# Patient Record
Sex: MALE | Race: WHITE | NOT HISPANIC OR LATINO | Employment: FULL TIME | URBAN - METROPOLITAN AREA
[De-identification: names, ages, dates, MRNs, and addresses within clinical notes are randomized per-mention and may not be internally consistent; named-entity substitution may affect disease eponyms.]

---

## 2017-11-10 ENCOUNTER — OFFICE VISIT (OUTPATIENT)
Dept: FAMILY MEDICINE | Facility: CLINIC | Age: 22
End: 2017-11-10
Payer: COMMERCIAL

## 2017-11-10 VITALS
OXYGEN SATURATION: 95 % | HEIGHT: 73 IN | BODY MASS INDEX: 37.37 KG/M2 | TEMPERATURE: 98.1 F | HEART RATE: 73 BPM | RESPIRATION RATE: 16 BRPM | WEIGHT: 282 LBS | DIASTOLIC BLOOD PRESSURE: 70 MMHG | SYSTOLIC BLOOD PRESSURE: 120 MMHG

## 2017-11-10 DIAGNOSIS — R05.9 COUGH: Primary | ICD-10-CM

## 2017-11-10 PROCEDURE — 99213 OFFICE O/P EST LOW 20 MIN: CPT | Performed by: PHYSICIAN ASSISTANT

## 2017-11-10 RX ORDER — AZITHROMYCIN 250 MG/1
TABLET, FILM COATED ORAL
Qty: 6 TABLET | Refills: 0 | Status: SHIPPED | OUTPATIENT
Start: 2017-11-10 | End: 2017-11-22

## 2017-11-10 NOTE — NURSING NOTE
"Chief Complaint   Patient presents with     URI       Initial /70  Pulse 73  Temp 98.1  F (36.7  C) (Tympanic)  Resp 16  Ht 6' 1\" (1.854 m)  Wt 282 lb (127.9 kg)  SpO2 95%  BMI 37.21 kg/m2 Estimated body mass index is 37.21 kg/(m^2) as calculated from the following:    Height as of this encounter: 6' 1\" (1.854 m).    Weight as of this encounter: 282 lb (127.9 kg).  Medication Reconciliation: complete     Liss Lazaro CMA      "

## 2017-11-10 NOTE — MR AVS SNAPSHOT
"              After Visit Summary   11/10/2017    Shubham Castaneda    MRN: [id number]           Patient Information     Date Of Birth          1995        Visit Information        Provider Department      11/10/2017 1:50 PM Siegler, Nicole Joy, PA-C [facility]        Today's Diagnoses     Cough    -  1       Follow-ups after your visit        Your next 10 appointments already scheduled     Nov 22, 2017 10:30 AM CST   PHYSICAL with Gunnar Li MD   [facility]    [address] [place] [id number] [phone]              Who to contact     If you have questions or need follow up information about today's clinic visit or your schedule please contact [facility] directly at [phone].  Normal or non-critical lab and imaging results will be communicated to you by MyChart, letter or phone within 4 business days after the clinic has received the results. If you do not hear from us within 7 days, please contact the clinic through MyChart or phone. If you have a critical or abnormal lab result, we will notify you by phone as soon as possible.  Submit refill requests through [email or web address] or call your pharmacy and they will forward the refill request to us. Please allow 3 business days for your refill to be completed.          Additional Information About Your Visit        MyChart Information     [email or web address] lets you send messages to your doctor, view your test results, renew your prescriptions, schedule appointments and more. To sign up, go to [email or web address] . Click on \"Log in\" on the left side of the screen, which will take you to the Welcome page. Then click on \"Sign up Now\" on the right side of the page.     You will be asked to enter the access code listed below, as well as some personal information. Please follow the directions to create your " "username and password.     Your access code is: [email or web address]  Expires: 2018  2:35 PM     Your access code will  in 90 days. If you need help or a new code, please call your [facility] or [phone].        Care EveryWhere ID     This is your Care EveryWhere ID. This could be used by other organizations to access your [place] medical records  [id number]        Your Vitals Were     Pulse Temperature Respirations Height Pulse Oximetry BMI (Body Mass Index)    73 98.1  F (36.7  C) (Tympanic) 16 6' 1\" (1.854 m) 95% 37.21 kg/m2       Blood Pressure from Last 3 Encounters:   11/10/17 120/70   14 138/71    Weight from Last 3 Encounters:   11/10/17 282 lb (127.9 kg)   14 235 lb (106.6 kg) (99 %)*   06 104 lb (47.2 kg) (95 %)*     * Growth percentiles are based on [facility] 2-20 Years data.              Today, you had the following     No orders found for display         Today's Medication Changes          These changes are accurate as of: 11/10/17  2:35 PM.  If you have any questions, ask your nurse or doctor.               Start taking these medicines.        Dose/Directions    azithromycin 250 MG tablet   Commonly known as:  ZITHROMAX   Used for:  Cough   Started by:  Siegler, Nicole Joy, PA-C        Two tablets first day, then one tablet daily for four days.   Quantity:  6 tablet   Refills:  0            Where to get your medicines      These medications were sent to [facility] [address] [place] [address] [address]     Phone:  [phone]     azithromycin 250 MG tablet                Primary Care Provider Office Phone # Fax #    Gunnar Li [phone] [address] [facility] [address]        Equal Access to Services     DEYA MAGALLON AH: Hadii aad ku hadasho Soomaali, waaxda luqadaha, qaybta kaaljessy gill ah. [facility] [phone].    ATENCIÓN: Si olman gaitan, " tiene a palumbo disposición servicios gratuitos de asistencia lingüística. Josias denney [phone].    We comply with applicable federal civil rights laws and [place] laws. We do not discriminate on the basis of race, color, national origin, age, disability, sex, sexual orientation, or gender identity.            Thank you!     Thank you for choosing [facility]  for your care. Our goal is always to provide you with excellent care. Hearing back from our patients is one way we can continue to improve our services. Please take a few minutes to complete the written survey that you may receive in the mail after your visit with us. Thank you!             Your Updated Medication List - Protect others around you: Learn how to safely use, store and throw away your medicines at www.disposemymeds.org.          This list is accurate as of: 11/10/17  2:35 PM.  Always use your most recent med list.                   Brand Name Dispense Instructions for use Diagnosis    azithromycin 250 MG tablet    ZITHROMAX    6 tablet    Two tablets first day, then one tablet daily for four days.    Cough

## 2017-11-10 NOTE — PROGRESS NOTES
"  SUBJECTIVE:   Shubham Castaneda is a 22 year old male who presents to clinic today for the following health issues:    RESPIRATORY SYMPTOMS      Duration: 2 weeks    Description    nasal congestion, rhinorrhea, cough- dark grey sputum in the morning    Severity: moderate    Accompanying signs and symptoms: None    History (predisposing factors):  none    Precipitating or alleviating factors: None    Therapies tried and outcome:  Dayquil and Nyquil         ROS:  C: Negative for fever, chills, recent change in weight  SKIN: no rash or redness  Resp: Positive for no productive cough that is making him vomit and dry heave  GI: Negative for nausea, abdominal pain, heartburn, or change in bowel habits  MS: Negative for significant arthralgias or myalgias      PFSH: not a smoker, no hx of asthma.   No known illness exposure. He has been vaccinated against pertussis but has had it in the past with similar symptoms after being vaccinated.      Patient Active Problem List   Diagnosis     Other jaw asymmetry     Current Outpatient Prescriptions   Medication     azithromycin (ZITHROMAX) 250 MG tablet     No current facility-administered medications for this visit.        OBJECTIVE:                                                    /70  Pulse 73  Temp 98.1  F (36.7  C) (Tympanic)  Resp 16  Ht 6' 1\" (1.854 m)  Wt 282 lb (127.9 kg)  SpO2 95%  BMI 37.21 kg/m2  Body mass index is 37.21 kg/(m^2).  GENERAL: healthy, alert, in no acute distress  EYES: Grossly normal to inspection, EOMI, PERRL  HENT: Ear canals normal; TMs pearly gray without effusion. Nasal mucosa moist without edema or discharge. Oral mucous membranes moist, no lesions or ulcerations. Pharynx pink.  Uvula midline.  No postnasal drainage. Sinuses non-tender to palpation.  NECK: Non-tender, no adenopathy.  RESP: lungs clear to auscultation - no rales, no rhonchi, no wheezes  CV: regular rate and rhythm, normal S1 S2.  No peripheral edema.  SKIN: no " suspicious lesions, no rashes  PSYCH: Alert and oriented times 3;  Able to articulate logical thoughts. Affect is normal.    Diagnostic test results: none      ASSESSMENT/PLAN:                                                        ICD-10-CM    1. Cough R05 azithromycin (ZITHROMAX) 250 MG tablet     Discussed potential viral etiology of cough and instructions below for care. Provided antibiotic for pertussis as his post cough vomiting is suggestive.     Upper Respiratory Infection:    Your infection is most likely a virus at this point. Viruses cannot be treated by an antibiotic. These can take up to ten days until you start feeling better.     Mucinex 1200 mg twice daily is great to loosen up secretions in the sinuses, nasal passages, and upper airways.    Tylenol or Ibuprofen can be used for aches and pains or mild fevers.    Try saline sinus washes using a neti pot (they come with packets to make a salt/baking soda/water solution) or saline nasal spray to help with nasal congestion. Long hot steamy showers, or head over steamy water can help with congestion. Decongestants such as Sudafed can also be used if you don't have a history of high blood pressure.     Drink at least 8 glasses of water daily - this helps keep mucus thin and keeps you hydrated to help with fever control.     Salt water gargles, chloraseptic spray, or lozenges (with menthol) help with a sore throat. Honey can also be soothing for the throat.    Maintain a healthy diet to help your immune system combat this. Get lots of rest to help your body get over the illness. If you continue to have symptoms longer than 7-10 days or if they become worse, please return to clinic for further evaluation.        Nicole Joy Siegler, PA-C  [facility]

## 2017-11-11 ENCOUNTER — HEALTH MAINTENANCE LETTER (OUTPATIENT)
Age: 22
End: 2017-11-11

## 2017-11-22 ENCOUNTER — OFFICE VISIT (OUTPATIENT)
Dept: FAMILY MEDICINE | Facility: CLINIC | Age: 22
End: 2017-11-22
Payer: COMMERCIAL

## 2017-11-22 VITALS
WEIGHT: 282 LBS | SYSTOLIC BLOOD PRESSURE: 136 MMHG | DIASTOLIC BLOOD PRESSURE: 80 MMHG | RESPIRATION RATE: 16 BRPM | TEMPERATURE: 97.8 F | HEIGHT: 74 IN | BODY MASS INDEX: 36.19 KG/M2 | HEART RATE: 77 BPM | OXYGEN SATURATION: 96 %

## 2017-11-22 DIAGNOSIS — E66.09 CLASS 2 OBESITY DUE TO EXCESS CALORIES WITHOUT SERIOUS COMORBIDITY WITH BODY MASS INDEX (BMI) OF 35.0 TO 35.9 IN ADULT: ICD-10-CM

## 2017-11-22 DIAGNOSIS — Z11.3 SCREEN FOR STD (SEXUALLY TRANSMITTED DISEASE): ICD-10-CM

## 2017-11-22 DIAGNOSIS — Z00.00 ROUTINE MEDICAL EXAM: Primary | ICD-10-CM

## 2017-11-22 DIAGNOSIS — R61 NIGHT SWEATS: ICD-10-CM

## 2017-11-22 DIAGNOSIS — E66.812 CLASS 2 OBESITY DUE TO EXCESS CALORIES WITHOUT SERIOUS COMORBIDITY WITH BODY MASS INDEX (BMI) OF 35.0 TO 35.9 IN ADULT: ICD-10-CM

## 2017-11-22 LAB
ALBUMIN UR-MCNC: NEGATIVE MG/DL
APPEARANCE UR: CLEAR
BASOPHILS # BLD AUTO: 0 10E9/L (ref 0–0.2)
BASOPHILS NFR BLD AUTO: 0.5 %
BILIRUB UR QL STRIP: NEGATIVE
COLOR UR AUTO: YELLOW
DIFFERENTIAL METHOD BLD: NORMAL
EOSINOPHIL # BLD AUTO: 0.1 10E9/L (ref 0–0.7)
EOSINOPHIL NFR BLD AUTO: 0.9 %
ERYTHROCYTE [DISTWIDTH] IN BLOOD BY AUTOMATED COUNT: 12.9 % (ref 10–15)
GLUCOSE UR STRIP-MCNC: NEGATIVE MG/DL
HCT VFR BLD AUTO: 44.7 % (ref 40–53)
HGB BLD-MCNC: 15.5 G/DL (ref 13.3–17.7)
HGB UR QL STRIP: NEGATIVE
KETONES UR STRIP-MCNC: NEGATIVE MG/DL
LEUKOCYTE ESTERASE UR QL STRIP: NEGATIVE
LYMPHOCYTES # BLD AUTO: 1.9 10E9/L (ref 0.8–5.3)
LYMPHOCYTES NFR BLD AUTO: 26 %
MCH RBC QN AUTO: 29.2 PG (ref 26.5–33)
MCHC RBC AUTO-ENTMCNC: 34.7 G/DL (ref 31.5–36.5)
MCV RBC AUTO: 84 FL (ref 78–100)
MONOCYTES # BLD AUTO: 0.9 10E9/L (ref 0–1.3)
MONOCYTES NFR BLD AUTO: 11.8 %
NEUTROPHILS # BLD AUTO: 4.5 10E9/L (ref 1.6–8.3)
NEUTROPHILS NFR BLD AUTO: 60.8 %
NITRATE UR QL: NEGATIVE
PH UR STRIP: 6 PH (ref 5–7)
PLATELET # BLD AUTO: 275 10E9/L (ref 150–450)
RBC # BLD AUTO: 5.3 10E12/L (ref 4.4–5.9)
RBC #/AREA URNS AUTO: NORMAL /HPF
SOURCE: NORMAL
SP GR UR STRIP: 1.02 (ref 1–1.03)
UROBILINOGEN UR STRIP-ACNC: 0.2 EU/DL (ref 0.2–1)
WBC # BLD AUTO: 7.5 10E9/L (ref 4–11)
WBC #/AREA URNS AUTO: NORMAL /HPF

## 2017-11-22 PROCEDURE — 36415 COLL VENOUS BLD VENIPUNCTURE: CPT | Performed by: FAMILY MEDICINE

## 2017-11-22 PROCEDURE — 80061 LIPID PANEL: CPT | Performed by: FAMILY MEDICINE

## 2017-11-22 PROCEDURE — 87591 N.GONORRHOEAE DNA AMP PROB: CPT | Performed by: FAMILY MEDICINE

## 2017-11-22 PROCEDURE — 80053 COMPREHEN METABOLIC PANEL: CPT | Performed by: FAMILY MEDICINE

## 2017-11-22 PROCEDURE — 90715 TDAP VACCINE 7 YRS/> IM: CPT | Performed by: FAMILY MEDICINE

## 2017-11-22 PROCEDURE — 85025 COMPLETE CBC W/AUTO DIFF WBC: CPT | Performed by: FAMILY MEDICINE

## 2017-11-22 PROCEDURE — 86780 TREPONEMA PALLIDUM: CPT | Performed by: FAMILY MEDICINE

## 2017-11-22 PROCEDURE — 87491 CHLMYD TRACH DNA AMP PROBE: CPT | Performed by: FAMILY MEDICINE

## 2017-11-22 PROCEDURE — 90471 IMMUNIZATION ADMIN: CPT | Performed by: FAMILY MEDICINE

## 2017-11-22 PROCEDURE — 99395 PREV VISIT EST AGE 18-39: CPT | Mod: 25 | Performed by: FAMILY MEDICINE

## 2017-11-22 PROCEDURE — 81001 URINALYSIS AUTO W/SCOPE: CPT | Performed by: FAMILY MEDICINE

## 2017-11-22 PROCEDURE — 87389 HIV-1 AG W/HIV-1&-2 AB AG IA: CPT | Performed by: FAMILY MEDICINE

## 2017-11-22 NOTE — NURSING NOTE
"Chief Complaint   Patient presents with     Physical     night sweats       Initial /80  Pulse 77  Temp 97.8  F (36.6  C)  Resp 16  Ht 6' 2\" (1.88 m)  Wt 282 lb (127.9 kg)  SpO2 96%  BMI 36.21 kg/m2 Estimated body mass index is 36.21 kg/(m^2) as calculated from the following:    Height as of this encounter: 6' 2\" (1.88 m).    Weight as of this encounter: 282 lb (127.9 kg).  Medication Reconciliation: wojciech Luis CMA      "

## 2017-11-22 NOTE — MR AVS SNAPSHOT
After Visit Summary   11/22/2017    Shubham Castaneda    MRN: [id number]           Patient Information     Date Of Birth          1995        Visit Information        Provider Department      11/22/2017 10:30 AM Gunnar Li MD [facility]        Today's Diagnoses     Routine medical exam    -  1    Night sweats        Screen for STD (sexually transmitted disease)          Care Instructions      Preventive Health Recommendations  Male Ages 18 - 25     Yearly exam:             See your health care provider every year in order to  o   Review health changes.   o   Discuss preventive care.    o   Review your medicines if your doctor has prescribed any.    You should be tested each year for STDs (sexually transmitted diseases).     Talk to your provider about cholesterol testing.      If you are at risk for diabetes, you should have a diabetes test (fasting glucose).    Shots: Get a flu shot each year. Get a tetanus shot every 10 years.     Nutrition:    Eat at least 5 servings of fruits and vegetables daily.     Eat whole-grain bread, whole-wheat pasta and brown rice instead of white grains and rice.     Talk to your provider about calcium and Vitamin D.     Lifestyle    Exercise for at least 150 minutes a week (30 minutes a day, 5 days a week). This will help you control your weight and prevent disease.     Limit alcohol to one drink per day.     No smoking.     Wear sunscreen to prevent skin cancer.     See your dentist every six months for an exam and cleaning.             Follow-ups after your visit        Who to contact     If you have questions or need follow up information about today's clinic visit or your schedule please contact [facility] directly at [phone].  Normal or non-critical lab and imaging results will be communicated to you by MyChart, letter or phone within 4 business days after the clinic has received the  "results. If you do not hear from us within 7 days, please contact the clinic through [employer] or phone. If you have a critical or abnormal lab result, we will notify you by phone as soon as possible.  Submit refill requests through [employer] or call your pharmacy and they will forward the refill request to us. Please allow 3 business days for your refill to be completed.          Additional Information About Your Visit        Vis[employer] Information     [employer] lets you send messages to your doctor, view your test results, renew your prescriptions, schedule appointments and more. To sign up, go to [email or web address] . Click on \"Log in\" on the left side of the screen, which will take you to the Welcome page. Then click on \"Sign up Now\" on the right side of the page.     You will be asked to enter the access code listed below, as well as some personal information. Please follow the directions to create your username and password.     Your access code is: [email or web address]  Expires: 2018  2:35 PM     Your access code will  in 90 days. If you need help or a new code, please call your [place] clinic or [phone].        Care EveryWhere ID     This is your Care EveryWhere ID. This could be used by other organizations to access your [place] medical records  [id number]        Your Vitals Were     Pulse Temperature Respirations Height Pulse Oximetry BMI (Body Mass Index)    77 97.8  F (36.6  C) 16 6' 2\" (1.88 m) 96% 36.21 kg/m2       Blood Pressure from Last 3 Encounters:   17 136/80   11/10/17 120/70   14 138/71    Weight from Last 3 Encounters:   17 282 lb (127.9 kg)   11/10/17 282 lb (127.9 kg)   14 235 lb (106.6 kg) (99 %)*     * Growth percentiles are based on CDC 2-20 Years data.              We Performed the Following     ADMIN 1st VACCINE     Anti Treponema     CBC with platelets differential     CHLAMYDIA TRACHOMATIS PCR     Comprehensive metabolic panel     HIV Antigen Antibody " Combo     Lipid Profile     NEISSERIA GONORRHOEA PCR     TDAP VACCINE (ADACEL)     UA with Microscopic        Primary Care Provider Office Phone # Fax #    Gunnar Li [phone] [address] [facility] [address]        Equal Access to Services     DEYA MAGALLON : Hadii aad ku hadasho Soomaali, waaxda luqadaha, qaybta kaalmada adeegyada, waxay idiin hayaan adeeg kharash la'jose luisn . So [facility] [phone].    ATENCIÓN: Si habla español, tiene a palumbo disposición servicios gratuitos de asistencia lingüística. Llame al [phone].    We comply with applicable federal civil rights laws and [place] laws. We do not discriminate on the basis of race, color, national origin, age, disability, sex, sexual orientation, or gender identity.            Thank you!     Thank you for choosing [facility]  for your care. Our goal is always to provide you with excellent care. Hearing back from our patients is one way we can continue to improve our services. Please take a few minutes to complete the written survey that you may receive in the mail after your visit with us. Thank you!             Your Updated Medication List - Protect others around you: Learn how to safely use, store and throw away your medicines at www.disposemymeds.org.      Notice  As of 11/22/2017 10:47 AM    You have not been prescribed any medications.

## 2017-11-22 NOTE — PROGRESS NOTES
SUBJECTIVE:   CC: Shubham Castaneda is an 22 year old male who presents for preventative health visit.     Physical   Annual:     Getting at least 3 servings of Calcium per day::  Yes    Bi-annual eye exam::  Yes    Dental care twice a year::  Yes    Sleep apnea or symptoms of sleep apnea::  None    Diet::  Regular (no restrictions)    Frequency of exercise::  2-3 days/week    Duration of exercise::  Greater than 60 minutes    Taking medications regularly::  Yes    Medication side effects::  Not applicable    Additional concerns today::  No            Night Sweats      Duration: 5-6 weeks    Description (location/character/radiation): pt has been having night sweats 4-5 times weekly.    Intensity:  moderate    Accompanying signs and symptoms: none    History (similar episodes/previous evaluation): None    Precipitating or alleviating factors: None    Therapies tried and outcome: None         Today's PHQ-2 Score:   PHQ-2 ( 1999 Pfizer) 11/22/2017   Q1: Little interest or pleasure in doing things 1   Q2: Feeling down, depressed or hopeless 0   PHQ-2 Score 1   Q1: Little interest or pleasure in doing things Several days   Q2: Feeling down, depressed or hopeless Not at all   PHQ-2 Score 1       Abuse: Current or Past(Physical, Sexual or Emotional)- No  Do you feel safe in your environment - Yes    Social History   Substance Use Topics     Smoking status: Never Smoker     Smokeless tobacco: Never Used     Alcohol use Yes      Comment: rare     The patient does not drink >3 drinks per day nor >7 drinks per week.    Last PSA: No results found for: PSA    Reviewed orders with patient. Reviewed health maintenance and updated orders accordingly - Yes  Patient Active Problem List   Diagnosis     Other jaw asymmetry     Night sweats     Class 2 obesity due to excess calories without serious comorbidity with body mass index (BMI) of 35.0 to 35.9 in adult     Past Surgical History:   Procedure Laterality Date     ENT SURGERY       "wisdom teeth     LE FORT ONE , OSTEOTOMY SAGITTAL SPLIT, COMBINED  4/17/2014    Procedure: LE FORT ONE, OSTEOTOMY SAGITTAL SPLIT ;  Surgeon: Channing Ravi DDS;  Location:  OR       Social History   Substance Use Topics     Smoking status: Never Smoker     Smokeless tobacco: Never Used     Alcohol use Yes      Comment: rare     History reviewed. No pertinent family history.          Reviewed and updated as needed this visit by clinical staff  Tobacco  Allergies  Meds  Med Hx  Surg Hx  Fam Hx  Soc Hx        Reviewed and updated as needed this visit by Provider          Review of Systems  C: NEGATIVE for fever, chills, change in weight  I: NEGATIVE for worrisome rashes, moles or lesions  E: NEGATIVE for vision changes or irritation  ENT: NEGATIVE for ear, mouth and throat problems  R: NEGATIVE for significant cough or SOB  CV: NEGATIVE for chest pain, palpitations or peripheral edema  GI: NEGATIVE for nausea, abdominal pain, heartburn, or change in bowel habits   male: negative for dysuria, hematuria, decreased urinary stream, erectile dysfunction, urethral discharge  M: NEGATIVE for significant arthralgias or myalgia  N: NEGATIVE for weakness, dizziness or paresthesias  ENDOCRINE: POSITIVE  for night sweats  P: NEGATIVE for changes in mood or affect    OBJECTIVE:   /80  Pulse 77  Temp 97.8  F (36.6  C)  Resp 16  Ht 6' 2\" (1.88 m)  Wt 282 lb (127.9 kg)  SpO2 96%  BMI 36.21 kg/m2    Physical Exam  GENERAL: healthy, alert and no distress  EYES: Eyes grossly normal to inspection, PERRL and conjunctivae and sclerae normal  HENT: ear canals and TM's normal, nose and mouth without ulcers or lesions  NECK: no adenopathy, no asymmetry, masses, or scars and thyroid normal to palpation  RESP: lungs clear to auscultation - no rales, rhonchi or wheezes  CV: regular rate and rhythm, normal S1 S2, no S3 or S4, no murmur, click or rub, no peripheral edema and peripheral pulses strong  ABDOMEN: soft, nontender, " "no hepatosplenomegaly, no masses and bowel sounds normal   (male): normal male genitalia without lesions or urethral discharge, no hernia  MS: no gross musculoskeletal defects noted, no edema  SKIN: no suspicious lesions or rashes  NEURO: Normal strength and tone, mentation intact and speech normal  PSYCH: mentation appears normal, affect normal/bright  LYMPH: no cervical, supraclavicular, axillary, or inguinal adenopathy    ASSESSMENT/PLAN:       ICD-10-CM    1. Routine medical exam Z00.00 TDAP VACCINE (ADACEL)     ADMIN 1st VACCINE     UA with Microscopic     CBC with platelets differential     Comprehensive metabolic panel     Lipid Profile   2. Night sweats R61    3. Screen for STD (sexually transmitted disease) Z11.3 NEISSERIA GONORRHOEA PCR     CHLAMYDIA TRACHOMATIS PCR     HIV Antigen Antibody Combo     Anti Treponema   4. Class 2 obesity due to excess calories without serious comorbidity with body mass index (BMI) of 35.0 to 35.9 in adult E66.09     Z68.35        COUNSELING:   Reviewed preventive health counseling, as reflected in patient instructions       Regular exercise       Healthy diet/nutrition       Immunizations    Vaccinated for: TDAP          BP Screening:   Last 3 BP Readings:    BP Readings from Last 3 Encounters:   11/22/17 136/80   11/10/17 120/70   04/18/14 138/71       The following was recommended to the patient:  Re-screen BP within a year and recommended lifestyle modifications       reports that he has never smoked. He has never used smokeless tobacco.      Estimated body mass index is 36.21 kg/(m^2) as calculated from the following:    Height as of this encounter: 6' 2\" (1.88 m).    Weight as of this encounter: 282 lb (127.9 kg).   Weight management plan: Discussed healthy diet and exercise guidelines and patient will follow up in 12 months in clinic to re-evaluate.    Counseling Resources:  ATP IV Guidelines  Pooled Cohorts Equation Calculator  FRAX Risk Assessment  ICSI Preventive " Guidelines  Dietary Guidelines for Americans, 2010  USDA's MyPlate  ASA Prophylaxis  Lung CA Screening    Gunnar Li MD  [facility]

## 2017-11-23 LAB
ALBUMIN SERPL-MCNC: 4.1 G/DL (ref 3.4–5)
ALP SERPL-CCNC: 99 U/L (ref 40–150)
ALT SERPL W P-5'-P-CCNC: 89 U/L (ref 0–70)
ANION GAP SERPL CALCULATED.3IONS-SCNC: 10 MMOL/L (ref 3–14)
AST SERPL W P-5'-P-CCNC: 38 U/L (ref 0–45)
BILIRUB SERPL-MCNC: 0.4 MG/DL (ref 0.2–1.3)
BUN SERPL-MCNC: 10 MG/DL (ref 7–30)
CALCIUM SERPL-MCNC: 9.4 MG/DL (ref 8.5–10.1)
CHLORIDE SERPL-SCNC: 106 MMOL/L (ref 94–109)
CHOLEST SERPL-MCNC: 202 MG/DL
CO2 SERPL-SCNC: 24 MMOL/L (ref 20–32)
CREAT SERPL-MCNC: 0.9 MG/DL (ref 0.66–1.25)
GFR SERPL CREATININE-BSD FRML MDRD: >90 ML/MIN/1.7M2
GLUCOSE SERPL-MCNC: 84 MG/DL (ref 70–99)
HDLC SERPL-MCNC: 30 MG/DL
LDLC SERPL CALC-MCNC: 112 MG/DL
NONHDLC SERPL-MCNC: 172 MG/DL
POTASSIUM SERPL-SCNC: 4.2 MMOL/L (ref 3.4–5.3)
PROT SERPL-MCNC: 7.9 G/DL (ref 6.8–8.8)
SODIUM SERPL-SCNC: 140 MMOL/L (ref 133–144)
TRIGL SERPL-MCNC: 301 MG/DL

## 2017-11-24 LAB
C TRACH DNA SPEC QL NAA+PROBE: NEGATIVE
HIV 1+2 AB+HIV1 P24 AG SERPL QL IA: NONREACTIVE
N GONORRHOEA DNA SPEC QL NAA+PROBE: NEGATIVE
SPECIMEN SOURCE: NORMAL
SPECIMEN SOURCE: NORMAL
T PALLIDUM IGG+IGM SER QL: NEGATIVE

## 2018-03-19 ENCOUNTER — OFFICE VISIT (OUTPATIENT)
Dept: FAMILY MEDICINE | Facility: CLINIC | Age: 23
End: 2018-03-19
Payer: COMMERCIAL

## 2018-03-19 VITALS
BODY MASS INDEX: 34.92 KG/M2 | DIASTOLIC BLOOD PRESSURE: 78 MMHG | TEMPERATURE: 98.4 F | RESPIRATION RATE: 18 BRPM | HEART RATE: 103 BPM | OXYGEN SATURATION: 95 % | SYSTOLIC BLOOD PRESSURE: 138 MMHG | WEIGHT: 272 LBS

## 2018-03-19 DIAGNOSIS — J06.9 VIRAL URI WITH COUGH: ICD-10-CM

## 2018-03-19 DIAGNOSIS — A08.4 VIRAL GASTROENTERITIS: Primary | ICD-10-CM

## 2018-03-19 PROCEDURE — 99214 OFFICE O/P EST MOD 30 MIN: CPT | Performed by: PHYSICIAN ASSISTANT

## 2018-03-19 RX ORDER — CODEINE PHOSPHATE AND GUAIFENESIN 10; 100 MG/5ML; MG/5ML
2 SOLUTION ORAL EVERY 4 HOURS PRN
Qty: 120 ML | Refills: 0 | Status: SHIPPED | OUTPATIENT
Start: 2018-03-19 | End: 2018-09-20

## 2018-03-19 RX ORDER — ONDANSETRON 4 MG/1
4 TABLET, FILM COATED ORAL EVERY 8 HOURS PRN
Qty: 30 TABLET | Refills: 3 | Status: SHIPPED | OUTPATIENT
Start: 2018-03-19 | End: 2018-09-20

## 2018-03-19 RX ORDER — BENZONATATE 200 MG/1
200 CAPSULE ORAL 3 TIMES DAILY PRN
Qty: 30 CAPSULE | Refills: 1 | Status: SHIPPED | OUTPATIENT
Start: 2018-03-19 | End: 2018-09-20

## 2018-03-19 NOTE — NURSING NOTE
"Chief Complaint   Patient presents with     URI       Initial /78 (BP Location: Right arm, Patient Position: Chair, Cuff Size: Adult Large)  Pulse 103  Temp 98.4  F (36.9  C) (Tympanic)  Resp 18  Wt 272 lb (123.4 kg)  SpO2 95%  BMI 34.92 kg/m2 Estimated body mass index is 34.92 kg/(m^2) as calculated from the following:    Height as of 11/22/17: 6' 2\" (1.88 m).    Weight as of this encounter: 272 lb (123.4 kg).  Medication Reconciliation: complete    "

## 2018-03-19 NOTE — PROGRESS NOTES
SUBJECTIVE:   Shubham Castaneda is a 23 year old male who presents to clinic today for the following health issues:  ENT Symptoms           Symptoms: cc Present Absent Comment   Fever/Chills  x     Fatigue  x     Muscle Aches   x    Eye Irritation   x    Sneezing   x    Nasal Wyatt/Drg   x    Sinus Pressure/Pain  x     Loss of smell   x    Dental pain   x    Sore Throat   x    Swollen Glands   x    Ear Pain/Fullness   x    Cough  x     Wheeze  x     Chest Pain  x     Shortness of breath  x     Rash   x    Other  x  Vomiting, diarrhea     Symptom duration:  since friday   Symptom severity:  moderate   Treatments tried:  nyquil, ibuprofen, mucinex   Contacts:  roommate had strep throat, and also traveled recently      Reviewed and updated as needed this visit by clinical staff  Tobacco  Allergies  Meds  Problems  Med Hx  Surg Hx  Fam Hx  Soc Hx        Reviewed and updated as needed this visit by Provider  Tobacco  Allergies  Meds  Problems  Med Hx  Surg Hx  Fam Hx  Soc Hx        Additional complaints: None    HPI additional notes: Shubham presents today with   Chief Complaint   Patient presents with     URI   Diarrhea started yesterday at 3, has had 10 bowel movements since then.  Vomiting started Saturday night and Sunday night and more frequent at that time.  traveled about 2 weeks ago, not out of the country.           ROS:  C: POSITIVE for fever and chills.  Skin: Negative for worrisome rashes or lesions  ENT/MOUTH:POSITIVE for congestion.  Negative for ear pain, sore throat and sinus pressure.  Resp: POSITIVE for cough occasionally productive without  SOB and wheezing  GI: POSITIVE for diarrhea and vomiting  MS: POSITIVE for generalized fatigue and malaise.  NEURO: Negative  for headaches or dizziness.  P: Negative for changes in mood or affect  ROS otherwise negative.    Chart Review:  History   Smoking Status     Never Smoker   Smokeless Tobacco     Never Used       BP Readings from Last 3  Encounters:   03/19/18 138/78   11/22/17 136/80   11/10/17 120/70    Wt Readings from Last 3 Encounters:   03/19/18 272 lb (123.4 kg)   11/22/17 282 lb (127.9 kg)   11/10/17 282 lb (127.9 kg)                  Patient Active Problem List   Diagnosis     Night sweats     Class 2 obesity due to excess calories without serious comorbidity with body mass index (BMI) of 35.0 to 35.9 in adult     Past Surgical History:   Procedure Laterality Date     ENT SURGERY      wisdom teeth     LE FORT ONE , OSTEOTOMY SAGITTAL SPLIT, COMBINED  4/17/2014    Procedure: LE FORT ONE, OSTEOTOMY SAGITTAL SPLIT ;  Surgeon: Channing Ravi DDS;  Location: [facility] OR     Problem list, Medication list, Allergies, Medical/Social/Surg hx [facility] in [facility], updated as appropriate.   OBJECTIVE:                                                    /78 (BP Location: Right arm, Patient Position: Chair, Cuff Size: Adult Large)  Pulse 103  Temp 98.4  F (36.9  C) (Tympanic)  Resp 18  Wt 272 lb (123.4 kg)  SpO2 95%  BMI 34.92 kg/m2  Body mass index is 34.92 kg/(m^2).  GENERAL: healthy, alert, in no acute distress  EYES: Grossly normal to inspection, EOMI, PERRL  HENT: Ear canals normal bilaterally. TM pearly gray without effusion bilaterally. Nasal mucosa mildly edematous with clear rhinorrhea.  Mouth- mucous membranes moist, no lesions or ulcerations. Pharynx pink. and No tonsillary hypertrophy. Uvula midline, clear post-nasal drainage.  Maxillary and frontal sinuses nontender to palpation bilaterally  NECK: Non-tender, no adenopathy.  RESP: lungs clear to auscultation - no rales, no rhonchi, no wheezes  CV: regular rate and rhythm, normal S1 S2. No peripheral edema.  ABDOMEN: soft, nontender, no hepatosplenomegaly or masses. Normal bowel sounds in all four quadrants. Werner's negative, Rovsing's negative. No rebound.  MS: no gross deformities noted.  No CVA tenderness. No paralumbar tenderness.  SKIN: no suspicious lesions, no rashes  PSYCH: Alert  and oriented times 3;  Able to articulate logical thoughts. Affect is normal.    Diagnostic test results: None     ASSESSMENT/PLAN:                                                          ICD-10-CM    1. Viral gastroenteritis A08.4 ondansetron (ZOFRAN) 4 MG tablet   2. Viral URI with cough J06.9 guaiFENesin-codeine (ROBITUSSIN AC) 100-10 MG/5ML SOLN solution    B97.89 benzonatate (TESSALON) 200 MG capsule     Discussed both URI and GI symptoms are likely viral in nature and related to do different infections.  Will treat symptomatically.  Discussed mild diet.  If not improving in 5 days, pt can call and will maybe need to do stool testing.  May consider antibiotic for cough if not improving by Friday.    Discussed with mother that pt is also feeling depressed the last few months.  Was supposed to see a counselor at school today but that was canceled due to his illness.  Discussed he can come in in the future if it is recommended that he may need medication for treatment.    Please see patient instructions for treatment details.    Follow up in 7-10 days if not improving as anticipated.    Azra Morrell PA-C  [facility]

## 2018-03-19 NOTE — PATIENT INSTRUCTIONS
Diarrhea (Acute)  Diarrhea has several possible causes. Common  stomach flu  is caused by a virus and usually lasts 3-7 days. Food poisoning, bacteria or parasites are other causes for diarrhea. Only diarrhea caused by bacteria or parasites requires treatment with an antibiotic. Diarrhea from a virus or food poisoning improves with simple home treatment.  The most important thing is to stay well hydrated as you lose a lot of water through you bowel movements.  Home Care:  If symptoms are severe, rest for the next 24 hours or until you are feeling better.  You may use acetaminophen (Tylenol) or ibuprofen (Motrin, Advil) to control fever, (Aspirin should never be used in anyone under 18 years of age who is ill with a fever. It may cause severe liver damage.)  Sometimes anti-diarrhea medicine can make your condition worse if the cause is an infectious diarrhea. Therefore, anti-diarrhea medicine should not be taken for this condition unless advised by your doctor.  During The First 24-48 Hours follow the diet below:  BEVERAGES: Sport drinks like Gatorade, soft drinks without caffeine; ginger ale, mineral water (plain or flavored), decaffeinated tea and coffee.  SOUPS: Clear broth, consommé and bouillon  DESSERTS: Plain gelatin (Jell-O), popsicles and fruit juice bars.  Hot cereal, plain toast, bread, rolls, crackers  Plain noodles, rice, mashed potatoes, chicken noodle or rice soup  Unsweetened canned fruit (avoid pineapple), bananas  Limit fat intake to less than 15 grams per day by avoiding margarine, butter, oils, mayonnaise, sauces, gravies, fried foods, peanut butter, meat, poultry and fish.  Limit fiber; avoid raw or cooked vegetables, fresh fruits (except bananas) and bran cereals.  Limit caffeine and chocolate. No spices or seasonings except salt.  During The Next 24 Hours, gradually resume a normal diet, as you feel better and your symptoms improve.  Follow Up with your doctor or as advised if you are not  improving over the next two days. If you were asked to bring a specimen from home, bring the sample on the day of collection. You may call in 2 days (or as directed) for the results.  Get Prompt Medical Attention if any of the following occur:  Increasing abdominal pain or constant lower right abdominal pain  Continued vomiting (unable to keep liquids down)  Blood in vomit or stool (black or red color)  Reduced oral intake  Dark urine, reduced urine output  Weakness, dizziness, fainting- these are signs of dehydration  Drowsiness, confusion, stiff neck or seizure  Fever of 100.4 F (38 C) oral or higher, not better with fever medication  New rash    OTC Medications for Loose Stools:    Pepto Bismol 262mg  Take 2 tablets (or 30mL if liquid) as needed every 30-60 minutes to prevent loose stools.  Do not take more than 8 tablets (or 240mL) per day.   Bowel movements may look green while using, this is due to the medication.    Loperamide 2mg:   Take 2 tabs (4mg) initially, then 1 tab (2mg) after each unformed stool.  Do not take more than 8 tabs (16mg) per day.    Stop using if you experience bloating or worsening abdominal pain.  Do not use for longer than 2 days.    Bowel movements may look green while using, this is due to the medication.

## 2018-09-20 ENCOUNTER — OFFICE VISIT (OUTPATIENT)
Dept: FAMILY MEDICINE | Facility: CLINIC | Age: 23
End: 2018-09-20
Payer: COMMERCIAL

## 2018-09-20 VITALS
TEMPERATURE: 98.5 F | BODY MASS INDEX: 34.91 KG/M2 | HEIGHT: 74 IN | SYSTOLIC BLOOD PRESSURE: 130 MMHG | DIASTOLIC BLOOD PRESSURE: 70 MMHG | HEART RATE: 87 BPM | RESPIRATION RATE: 16 BRPM | OXYGEN SATURATION: 98 % | WEIGHT: 272 LBS

## 2018-09-20 DIAGNOSIS — R73.9 HYPERGLYCEMIA: ICD-10-CM

## 2018-09-20 DIAGNOSIS — F32.A DEPRESSION, UNSPECIFIED DEPRESSION TYPE: Primary | ICD-10-CM

## 2018-09-20 DIAGNOSIS — Z23 NEED FOR PROPHYLACTIC VACCINATION AND INOCULATION AGAINST INFLUENZA: ICD-10-CM

## 2018-09-20 PROCEDURE — 84443 ASSAY THYROID STIM HORMONE: CPT | Performed by: FAMILY MEDICINE

## 2018-09-20 PROCEDURE — 80053 COMPREHEN METABOLIC PANEL: CPT | Performed by: FAMILY MEDICINE

## 2018-09-20 PROCEDURE — 99214 OFFICE O/P EST MOD 30 MIN: CPT | Mod: 25 | Performed by: FAMILY MEDICINE

## 2018-09-20 PROCEDURE — 80061 LIPID PANEL: CPT | Performed by: FAMILY MEDICINE

## 2018-09-20 PROCEDURE — 36415 COLL VENOUS BLD VENIPUNCTURE: CPT | Performed by: FAMILY MEDICINE

## 2018-09-20 PROCEDURE — 90686 IIV4 VACC NO PRSV 0.5 ML IM: CPT | Performed by: FAMILY MEDICINE

## 2018-09-20 PROCEDURE — 90471 IMMUNIZATION ADMIN: CPT | Performed by: FAMILY MEDICINE

## 2018-09-20 RX ORDER — SERTRALINE HYDROCHLORIDE 100 MG/1
100 TABLET, FILM COATED ORAL DAILY
Qty: 90 TABLET | Refills: 0 | Status: SHIPPED | OUTPATIENT
Start: 2018-09-20 | End: 2018-12-21

## 2018-09-20 ASSESSMENT — ANXIETY QUESTIONNAIRES
3. WORRYING TOO MUCH ABOUT DIFFERENT THINGS: MORE THAN HALF THE DAYS
1. FEELING NERVOUS, ANXIOUS, OR ON EDGE: MORE THAN HALF THE DAYS
5. BEING SO RESTLESS THAT IT IS HARD TO SIT STILL: NOT AT ALL
IF YOU CHECKED OFF ANY PROBLEMS ON THIS QUESTIONNAIRE, HOW DIFFICULT HAVE THESE PROBLEMS MADE IT FOR YOU TO DO YOUR WORK, TAKE CARE OF THINGS AT HOME, OR GET ALONG WITH OTHER PEOPLE: SOMEWHAT DIFFICULT
7. FEELING AFRAID AS IF SOMETHING AWFUL MIGHT HAPPEN: NOT AT ALL
2. NOT BEING ABLE TO STOP OR CONTROL WORRYING: MORE THAN HALF THE DAYS
6. BECOMING EASILY ANNOYED OR IRRITABLE: SEVERAL DAYS
GAD7 TOTAL SCORE: 7

## 2018-09-20 ASSESSMENT — PATIENT HEALTH QUESTIONNAIRE - PHQ9: 5. POOR APPETITE OR OVEREATING: NOT AT ALL

## 2018-09-20 NOTE — MR AVS SNAPSHOT
After Visit Summary   9/20/2018    Shubham Castaneda    MRN: [id number]           Patient Information     Date Of Birth          1995        Visit Information        Provider Department      9/20/2018 1:50 PM Jenifer Marks DO [facility]        Today's Diagnoses     Depression, unspecified depression type    -  1      Care Instructions      Depression  Depression is one of the most common mental health problems today. It is not just a state of unhappiness or sadness. It is a true disease. The cause seems to be related to a decrease in chemicals that transmit signals in the brain. Having a family history of depression, alcoholism, or suicide increases the risk. Chronic illness, chronic pain, migraine headaches, and high emotional stress also increase the risk.  Depression is something we tend to recognize in others, but may have a hard time seeing in ourselves. It can show in many physical and emotional ways:    Loss of appetite    Overeating    Not being able to sleep    Sleeping too much    Tiredness not related to physical exertion    Restlessness or irritability    Slowness of movement or speech    Feeling depressed or withdrawn    Loss of interest in things you once enjoyed    Trouble concentrating, poor memory, trouble making decisions    Thoughts of harming or killing oneself, or thoughts that life is not worth living    Low self-esteem  The treatment for depression may include both medicine and psychotherapy. Antidepressants can reduce suffering and can improve the ability to function during the depressed period. Therapy can offer emotional support and help you understand emotional factors that may be causing the depression.  Home care    Ongoing care and support help people manage this disease. Find a healthcare provider and therapist who meet your needs. Seek help when you feel like you may be getting ill.    Be kind to yourself. Make it a point to do  things that you enjoy (gardening, walking in nature, going to a movie). Reward yourself for small successes.    Take care of your physical body. Eat a balanced diet (low in saturated fat and high in fruits and vegetables). Exercise at least 3 times a week for 30 minutes. Even mild-moderate exercise (like brisk walking) can make you feel better.    Don't drink alcohol, which can make depression worse.    Take medicine as prescribed.    Tell each of your healthcare providers about all of the prescription and over-the-counter medicines, vitamins, and supplements you take. Certain supplements interact with medicines and can result in dangerous side effects. Ask your pharmacist when you have questions about medicine interactions.    Talk with your family and trusted friends about your feelings and thoughts. Ask them to help you recognize behavior changes early so you can get help and, if needed, medicine can be adjusted.  Follow-up care  Follow up with your healthcare provider, or as advised.  Call 911  Call 911 if you:    Have suicidal thoughts, a suicide plan, and the means to carry out the plan; or serious thoughts of hurting someone else     Have trouble breathing    Are very confused    Feel very drowsy or have trouble awakening    Faint or lose consciousness    Have new chest pain that becomes more severe, lasts longer, or spreads into your shoulder, arm, neck, jaw, or back  When to seek medical advice  Call your healthcare provider right away if any of these happen:    Feeling extreme depression, fear, anxiety, or anger toward yourself or others    Feeling out of control    Feeling that you may try to harm yourself or another    Hearing voices that others do not hear    Seeing things that others do not see    Can t sleep or eat for 3 days in a row    Friends or family express concern over your behavior and ask you to seek help  Date Last Reviewed: 10/1/2017    [phone] [employer]. [address], [place] [address]. All rights reserved. This information is not intended as a substitute for professional medical care. Always follow your healthcare professional's instructions.                Follow-ups after your visit        Follow-up notes from your care team     Return in about 6 weeks (around 11/1/2018) for med check.      Who to contact     If you have questions or need follow up information about today's clinic visit or your schedule please contact [facility] directly at [phone].  Normal or non-critical lab and imaging results will be communicated to you by [employer], letter or phone within 4 business days after the clinic has received the results. If you do not hear from us within 7 days, please contact the clinic through [employer] or phone. If you have a critical or abnormal lab result, we will notify you by phone as soon as possible.  Submit refill requests through [employer] or call your pharmacy and they will forward the refill request to us. Please allow 3 business days for your refill to be completed.          Additional Information About Your Visit        MyChart Information     [employer] gives you secure access to your electronic health record. If you see a primary care provider, you can also send messages to your care team and make appointments. If you have questions, please call your primary care clinic.  If you do not have a primary care provider, please call [phone] and they will assist you.        Care EveryWhere ID     This is your Care EveryWhere ID. This could be used by other organizations to access your [facility] medical records  [id number]        Your Vitals Were     Pulse Temperature Respirations Height Pulse Oximetry BMI (Body Mass Index)    87 98.5  F (36.9  C) (Tympanic) 16 6' 2\" (1.88 m) 98% 34.92 kg/m2       Blood Pressure from Last 3 Encounters:   09/20/18 130/70   03/19/18 138/78   11/22/17 136/80    Weight from Last 3 Encounters:   09/20/18 " 272 lb (123.4 kg)   03/19/18 272 lb (123.4 kg)   11/22/17 282 lb (127.9 kg)              We Performed the Following     Comprehensive metabolic panel (BMP + Alb, Alk Phos, ALT, AST, Total. Bili, TP)     Lipid Profile (Chol, Trig, HDL, LDL calc)     TSH with free T4 reflex          Today's Medication Changes          These changes are accurate as of 9/20/18  2:20 PM.  If you have any questions, ask your nurse or doctor.               Start taking these medicines.        Dose/Directions    sertraline 100 MG tablet   Commonly known as:  ZOLOFT   Used for:  Depression, unspecified depression type   Started by:  Jenifer Marks DO        Dose:  100 mg   Take 1 tablet (100 mg) by mouth daily   Quantity:  90 tablet   Refills:  0            Where to get your medicines      These medications were sent to [facility] [address] [place] [address]   [address] [facility] [address]     Phone:  [phone]     sertraline 100 MG tablet                Primary Care Provider Office Phone # Fax #    Gunnar Li [phone]       [address] [facility] [address] [facility] [address]        Equal Access to Services     DEYA MAGALLON AH: Hadii magalis ku hadasho Soomaali, waaxda luqadaha, qaybta kaalmada adeegyada, jessy martinez. So [facility] [phone].    ATENCIÓN: Si habla español, tiene a palumbo disposición servicios gratuitos de asistencia lingüística. Llame al [phone].    We comply with applicable federal civil rights laws and [place] laws. We do not discriminate on the basis of race, color, national origin, age, disability, sex, sexual orientation, or gender identity.            Thank you!     Thank you for choosing [facility]  for your care. Our goal is always to provide you with excellent care. Hearing back from our patients is one way we can continue to improve our services. Please take a few minutes to complete the written survey that  you may receive in the mail after your visit with us. Thank you!             Your Updated Medication List - Protect others around you: Learn how to safely use, store and throw away your medicines at www.disposemymeds.org.          This list is accurate as of 9/20/18  2:20 PM.  Always use your most recent med list.                   Brand Name Dispense Instructions for use Diagnosis    sertraline 100 MG tablet    ZOLOFT    90 tablet    Take 1 tablet (100 mg) by mouth daily    Depression, unspecified depression type

## 2018-09-20 NOTE — PROGRESS NOTES

## 2018-09-20 NOTE — NURSING NOTE
"Chief Complaint   Patient presents with     Recheck Medication     /70  Pulse 87  Temp 98.5  F (36.9  C) (Tympanic)  Resp 16  Ht 6' 2\" (1.88 m)  Wt 272 lb (123.4 kg)  SpO2 98%  BMI 34.92 kg/m2 Estimated body mass index is 34.92 kg/(m^2) as calculated from the following:    Height as of this encounter: 6' 2\" (1.88 m).    Weight as of this encounter: 272 lb (123.4 kg).  BP completed using cuff size: loi Balderas CMA    Health Maintenance Due   Topic Date Due     HPV IMMUNIZATION (3 of 3 - Male 3 Dose Series) 05/07/2014     INFLUENZA VACCINE (1) 09/01/2018     Health Maintenance reviewed at today's visit patient asked to schedule/complete:   Immunizations:  Patient agrees to schedule    "

## 2018-09-20 NOTE — PATIENT INSTRUCTIONS
Depression  Depression is one of the most common mental health problems today. It is not just a state of unhappiness or sadness. It is a true disease. The cause seems to be related to a decrease in chemicals that transmit signals in the brain. Having a family history of depression, alcoholism, or suicide increases the risk. Chronic illness, chronic pain, migraine headaches, and high emotional stress also increase the risk.  Depression is something we tend to recognize in others, but may have a hard time seeing in ourselves. It can show in many physical and emotional ways:    Loss of appetite    Overeating    Not being able to sleep    Sleeping too much    Tiredness not related to physical exertion    Restlessness or irritability    Slowness of movement or speech    Feeling depressed or withdrawn    Loss of interest in things you once enjoyed    Trouble concentrating, poor memory, trouble making decisions    Thoughts of harming or killing oneself, or thoughts that life is not worth living    Low self-esteem  The treatment for depression may include both medicine and psychotherapy. Antidepressants can reduce suffering and can improve the ability to function during the depressed period. Therapy can offer emotional support and help you understand emotional factors that may be causing the depression.  Home care    Ongoing care and support help people manage this disease. Find a healthcare provider and therapist who meet your needs. Seek help when you feel like you may be getting ill.    Be kind to yourself. Make it a point to do things that you enjoy (gardening, walking in nature, going to a movie). Reward yourself for small successes.    Take care of your physical body. Eat a balanced diet (low in saturated fat and high in fruits and vegetables). Exercise at least 3 times a week for 30 minutes. Even mild-moderate exercise (like brisk walking) can make you feel better.    Don't drink alcohol, which can make depression  worse.    Take medicine as prescribed.    Tell each of your healthcare providers about all of the prescription and over-the-counter medicines, vitamins, and supplements you take. Certain supplements interact with medicines and can result in dangerous side effects. Ask your pharmacist when you have questions about medicine interactions.    Talk with your family and trusted friends about your feelings and thoughts. Ask them to help you recognize behavior changes early so you can get help and, if needed, medicine can be adjusted.  Follow-up care  Follow up with your healthcare provider, or as advised.  Call 911  Call 911 if you:    Have suicidal thoughts, a suicide plan, and the means to carry out the plan; or serious thoughts of hurting someone else     Have trouble breathing    Are very confused    Feel very drowsy or have trouble awakening    Faint or lose consciousness    Have new chest pain that becomes more severe, lasts longer, or spreads into your shoulder, arm, neck, jaw, or back  When to seek medical advice  Call your healthcare provider right away if any of these happen:    Feeling extreme depression, fear, anxiety, or anger toward yourself or others    Feeling out of control    Feeling that you may try to harm yourself or another    Hearing voices that others do not hear    Seeing things that others do not see    Can t sleep or eat for 3 days in a row    Friends or family express concern over your behavior and ask you to seek help  Date Last Reviewed: 10/1/2017    [id number] [employer]. [address] [place] [address], [place] [address]. All rights reserved. This information is not intended as a substitute for professional medical care. Always follow your healthcare professional's instructions.

## 2018-09-20 NOTE — PROGRESS NOTES
"  SUBJECTIVE:   Shubham Castaneda is a 23 year old male who presents to clinic today for the following health issues:    Abnormal Mood Symptoms      Duration: x 3-4 years    Description:  Depression: YES  Anxiety: YES  Panic attacks: YES     Accompanying signs and symptoms: see PHQ-9 and ALISA scores    History (similar episodes/previous evaluation): Yes, Every winter    Precipitating or alleviating factors: None    Therapies tried and outcome: none        Problem list and histories reviewed & adjusted, as indicated.  Additional history: as documented    Labs reviewed in EPIC    Reviewed and updated as needed this visit by clinical staff  Tobacco  Allergies  Meds  Problems  Med Hx  Surg Hx  Fam Hx  Soc Hx        Reviewed and updated as needed this visit by Provider  Allergies  Meds  Problems         ROS:  CONSTITUTIONAL: NEGATIVE for fever, chills, change in weight  INTEGUMENTARY/SKIN: NEGATIVE for worrisome rashes, moles or lesions  EYES: NEGATIVE for vision changes or irritation  ENT/MOUTH: NEGATIVE for ear, mouth and throat problems  RESP: NEGATIVE for significant cough or SOB  CV: NEGATIVE for chest pain, palpitations or peripheral edema  GI: NEGATIVE for nausea, abdominal pain, heartburn, or change in bowel habits  : NEGATIVE for frequency, dysuria, or hematuria  MUSCULOSKELETAL: NEGATIVE for significant arthralgias or myalgia  NEURO: NEGATIVE for weakness, dizziness or paresthesias  HEME: NEGATIVE for bleeding problems    OBJECTIVE:     /70  Pulse 87  Temp 98.5  F (36.9  C) (Tympanic)  Resp 16  Ht 6' 2\" (1.88 m)  Wt 272 lb (123.4 kg)  SpO2 98%  BMI 34.92 kg/m2  Body mass index is 34.92 kg/(m^2).   GENERAL: Alert and no distress  EYES: Eyes grossly normal to inspection, PERRL and conjunctivae and sclerae normal  HENT: ear canals and TM's normal, nose and mouth without ulcers or lesions  NECK: no adenopathy, no asymmetry, masses, or scars and thyroid normal to palpation  RESP: lungs clear to " auscultation - no rales, rhonchi or wheezes  CV: regular rate and rhythm, normal S1 S2, no S3 or S4, no murmur, click or rub, no peripheral edema and peripheral pulses strong  ABDOMEN: soft, nontender, no hepatosplenomegaly, no masses and bowel sounds normal  MS: no gross musculoskeletal defects noted, no edema  SKIN: no suspicious lesions or rashes  NEURO: Normal strength and tone, mentation intact and speech normal  PSYCH: appears depressed and a little anxious.  Poor eye contact.  Denies SI.    Diagnostic Test Results:  TSH/T4, lipids, CMP  ASSESSMENT/PLAN:     Problem List Items Addressed This Visit     None      Visit Diagnoses     Depression, unspecified depression type    -  Primary    Relevant Medications    sertraline (ZOLOFT) 100 MG tablet    Other Relevant Orders    TSH with free T4 reflex (Completed)    Comprehensive metabolic panel (BMP + Alb, Alk Phos, ALT, AST, Total. Bili, TP) (Completed)    Lipid Profile (Chol, Trig, HDL, LDL calc) (Completed)    Need for prophylactic vaccination and inoculation against influenza        Relevant Orders    FLU VACCINE, SPLIT VIRUS, IM (QUADRIVALENT) [24438]- >3 YRS (Completed)    Vaccine Administration, Initial [21478] (Completed)         --Rx Sertraline.  Start at 50 mg po every day, increase to 100 mg po every day after 5-7 days.  RTC in 4-6 weeks to re-check med/mood.  May do E-visit.  Will check labs as well.  Last Nov 2017, had slightly elevated liver enzymes and very high triglycerides.   Discussed need for gradual increase of SSRI dose over time, titrating to effect.  Reviewed potential for initial side effects (such as headache, GI symptoms, and dry mouth) that will likely subside after a week or so, but that therapeutic effects will likely take 1-2 weeks - so it's important to stick with medication for at least a month to adequately gauge effect.  Notify me of any significant side effects.  Discussed that treatment with SSRI medications requires a minimum  commitment of 9-12 months; shorter courses are associated with rebound symptoms.  Discussed potential long-term side effects including sexual side effects.   --Agreeable to get flu vaccine as well.    Jenifer Marks, DO  [facility]

## 2018-09-21 LAB
ALBUMIN SERPL-MCNC: 4.6 G/DL (ref 3.4–5)
ALP SERPL-CCNC: 77 U/L (ref 40–150)
ALT SERPL W P-5'-P-CCNC: 88 U/L (ref 0–70)
ANION GAP SERPL CALCULATED.3IONS-SCNC: 8 MMOL/L (ref 3–14)
AST SERPL W P-5'-P-CCNC: 46 U/L (ref 0–45)
BILIRUB SERPL-MCNC: 0.5 MG/DL (ref 0.2–1.3)
BUN SERPL-MCNC: 17 MG/DL (ref 7–30)
CALCIUM SERPL-MCNC: 9.4 MG/DL (ref 8.5–10.1)
CHLORIDE SERPL-SCNC: 102 MMOL/L (ref 94–109)
CHOLEST SERPL-MCNC: 215 MG/DL
CO2 SERPL-SCNC: 26 MMOL/L (ref 20–32)
CREAT SERPL-MCNC: 0.99 MG/DL (ref 0.66–1.25)
GFR SERPL CREATININE-BSD FRML MDRD: >90 ML/MIN/1.7M2
GLUCOSE SERPL-MCNC: 104 MG/DL (ref 70–99)
HDLC SERPL-MCNC: 26 MG/DL
LDLC SERPL CALC-MCNC: 121 MG/DL
NONHDLC SERPL-MCNC: 189 MG/DL
POTASSIUM SERPL-SCNC: 3.9 MMOL/L (ref 3.4–5.3)
PROT SERPL-MCNC: 8.5 G/DL (ref 6.8–8.8)
SODIUM SERPL-SCNC: 136 MMOL/L (ref 133–144)
TRIGL SERPL-MCNC: 340 MG/DL
TSH SERPL DL<=0.005 MIU/L-ACNC: 1.54 MU/L (ref 0.4–4)

## 2018-09-21 ASSESSMENT — PATIENT HEALTH QUESTIONNAIRE - PHQ9: SUM OF ALL RESPONSES TO PHQ QUESTIONS 1-9: 11

## 2018-09-21 ASSESSMENT — ANXIETY QUESTIONNAIRES: GAD7 TOTAL SCORE: 7

## 2018-12-17 ENCOUNTER — TELEPHONE (OUTPATIENT)
Dept: FAMILY MEDICINE | Facility: CLINIC | Age: 23
End: 2018-12-17

## 2018-12-17 DIAGNOSIS — F32.A DEPRESSION, UNSPECIFIED DEPRESSION TYPE: ICD-10-CM

## 2018-12-18 NOTE — TELEPHONE ENCOUNTER
"Requested Prescriptions   Pending Prescriptions Disp Refills     sertraline (ZOLOFT) 100 MG tablet  Last Written Prescription Date:  9/20/18  Last Fill Quantity: 90,  # refills: 0   Last office visit: 9/20/2018 with prescribing provider:  dolly   Future Office Visit:    PHQ-9 SCORE 9/20/2018   PHQ-9 Total Score 11    90 tablet 0     Sig: Take 1 tablet (100 mg) by mouth daily    SSRIs Protocol Failed - 12/17/2018  4:34 PM       Failed - PHQ-9 score less than 5 in past 6 months    Please review last PHQ-9 score.          Passed - Patient is age 18 or older       Passed - Recent (6 mo) or future (30 days) visit within the authorizing provider's specialty    Patient had office visit in the last 6 months or has a visit in the next 30 days with authorizing provider or within the authorizing provider's specialty.  See \"Patient Info\" tab in inbasket, or \"Choose Columns\" in Meds & Orders section of the refill encounter.              "

## 2018-12-20 NOTE — TELEPHONE ENCOUNTER
PHQ-9 SCORE 9/20/2018   PHQ-9 Total Score 11       Routing refill request to provider for review/approval because:  PHQ 9 > 4

## 2018-12-21 RX ORDER — SERTRALINE HYDROCHLORIDE 100 MG/1
100 TABLET, FILM COATED ORAL DAILY
Qty: 90 TABLET | Refills: 0 | Status: SHIPPED | OUTPATIENT
Start: 2018-12-21 | End: 2019-02-13

## 2019-01-25 NOTE — TELEPHONE ENCOUNTER
Medication was already refilled by Dr. Li.  Can we get a PHQ-9 updated?    Shubham's PHQ-9 f/u just started on 1/20/19 (until 5/20/19)    Thanks!  --Dr. Marks

## 2019-01-28 ENCOUNTER — MYC MEDICAL ADVICE (OUTPATIENT)
Dept: FAMILY MEDICINE | Facility: CLINIC | Age: 24
End: 2019-01-28

## 2019-01-28 ENCOUNTER — TELEPHONE (OUTPATIENT)
Dept: FAMILY MEDICINE | Facility: CLINIC | Age: 24
End: 2019-01-28

## 2019-01-28 NOTE — LETTER
February 7, 2019    Shubham Castaneda  [address] [address]    Dear Anson Jalloh cares about your health and your health plan.  I have reviewed your medical conditions, medication list and lab results, and am making recommendations based on this review to better manage your health.    You are in particular need of attention regarding:  -Depression/Anxiety    I am recommending that you:     Please complete the enclosed PHQ9 and mail back to clinic in the envelope provided.         Please call us at the [facility] location:  [phone] or use [employer] to address the above recommendations.     Thank you for trusting [facility].  We appreciate the opportunity to serve you and look forward to supporting your healthcare in the future.    If you have (or plan to have) any of these tests done at a facility other than a [facility] or a [facility], please have the results sent to the [facility] location noted above.      Best Regards,    Jenifer Marks, DO

## 2019-01-28 NOTE — TELEPHONE ENCOUNTER
My Chart message with PHQ-9 attached.    Malu Galeano LPN   [FreeTextEntry1] : ACUTE VISIT OF 54 Y OLD FEM WHO PRESENTS WITH PERSISTENT SX OF MAXILLARY SINUSITIS ONB DAY 4 OF AMOXICILLIN= CHANGE TO LEVAQUIN 500 MG DAILY\par PARESTHESIAS OF PLANTAR ASPECT OF FEET= TO SEE POD\par DONALDO= RESUME LEXAPRO\par RTO 1 M OR PRN

## 2019-01-28 NOTE — TELEPHONE ENCOUNTER
Panel Management Review      Patient has the following on his problem list:     Depression / Dysthymia review    Measure:  Needs PHQ-9 score of 4 or less during index window.  Administer PHQ-9 and if score is 5 or more, send encounter to provider for next steps.    5 - 7 month window range:     PHQ-9 SCORE 9/20/2018   PHQ-9 Total Score 11       If PHQ-9 recheck is 5 or more, route to provider for next steps.    Patient is due for:  PHQ9---per Dr. Marks  Composite cancer screening  Chart review shows that this patient is due/due soon for the following None  Summary:    Patient is due/failing the following:   PHQ9    Action needed:   Patient needs to do PHQ9.    Type of outreach:    Sent [employer] message.    Questions for provider review:    None                                                                                                                                    Malu Galeano LPN     Chart routed to  .

## 2019-01-28 NOTE — LETTER
January 28, 2019    Shubham Castaneda  [address] [address]    Dear Anson Jalloh cares about your health and your health plan.  I have reviewed your medical conditions, medication list and lab results, and am making recommendations based on this review to better manage your health.    You are in particular need of attention regarding:  -Depression/Anxiety    I am recommending that you:     Please complete the attached  PHQ9          Please call us at the [facility] location:  [phone] or use [employer] to address the above recommendations.     Thank you for trusting [facility].  We appreciate the opportunity to serve you and look forward to supporting your healthcare in the future.    If you have (or plan to have) any of these tests done at a facility other than a [facility]] or a [facility], please have the results sent to the [facility] location noted above.      Best Regards,    Jenifer Marks, DO

## 2019-02-06 PROBLEM — F33.1 MODERATE EPISODE OF RECURRENT MAJOR DEPRESSIVE DISORDER (H): Status: ACTIVE | Noted: 2019-02-06

## 2019-02-07 NOTE — TELEPHONE ENCOUNTER
Panel Management Review      Patient has the following on his problem list:     Depression / Dysthymia review    Measure:  Needs PHQ-9 score of 4 or less during index window.  Administer PHQ-9 and if score is 5 or more, send encounter to provider for next steps.    5 - 7 month window range: 1/20/2019 to 05/20/2019    PHQ-9 SCORE 9/20/2018   PHQ-9 Total Score 11       If PHQ-9 recheck is 5 or more, route to provider for next steps.    Patient is due for:  PHQ9      Composite cancer screening  Chart review shows that this patient is due/due soon for the following None  Summary:    Patient is due/failing the following:   PHQ9    Action needed:   Patient needs to do PHQ9.    Type of outreach:    Sent letter. Patient did not complete PHQ-9 via My Chart. Followed up with letter.    Questions for provider review:    None                                                                                                                                    Malu Galeano LPN     Chart routed to Care Team .

## 2019-03-06 ENCOUNTER — TELEPHONE (OUTPATIENT)
Dept: FAMILY MEDICINE | Facility: CLINIC | Age: 24
End: 2019-03-06

## 2019-03-06 NOTE — LETTER
March 6, 2019    Shubham Castaneda  [address] [address]    Dear Anson Jalloh cares about your health and your health plan.  I have reviewed your medical conditions, medication list and lab results, and am making recommendations based on this review to better manage your health.    You are in particular need of attention regarding:  -Depression/Anxiety    I am recommending that you:     Please complete the enclosed PHQ9 and mail back to clinic in the envelope provided.         Please call us at the [employer] location:  [phone] or use [employer] to address the above recommendations.     Thank you for trusting [facility].  We appreciate the opportunity to serve you and look forward to supporting your healthcare in the future.    If you have (or plan to have) any of these tests done at a facility other than a [facility] or a [facility], please have the results sent to the [facility] location noted above.      Best Regards,    Jenifer Marks, DO

## 2019-03-06 NOTE — TELEPHONE ENCOUNTER
Panel Management Review      Patient has the following on his problem list:     Depression / Dysthymia review    Measure:  Needs PHQ-9 score of 4 or less during index window.  Administer PHQ-9 and if score is 5 or more, send encounter to provider for next steps.    5 - 7 month window range:     PHQ-9 SCORE 9/20/2018   PHQ-9 Total Score 11       If PHQ-9 recheck is 5 or more, route to provider for next steps.    Patient is due for:  PHQ9      Composite cancer screening  Chart review shows that this patient is due/due soon for the following None  Summary:    Patient is due/failing the following:   PHQ9    Action needed:   Patient needs to do PHQ9.    Type of outreach:    Sent letter.    Questions for provider review:    None                                                                                                                                    Malu Galeano LPN     Chart routed to  .

## 2019-03-15 ASSESSMENT — ANXIETY QUESTIONNAIRES
6. BECOMING EASILY ANNOYED OR IRRITABLE: SEVERAL DAYS
IF YOU CHECKED OFF ANY PROBLEMS ON THIS QUESTIONNAIRE, HOW DIFFICULT HAVE THESE PROBLEMS MADE IT FOR YOU TO DO YOUR WORK, TAKE CARE OF THINGS AT HOME, OR GET ALONG WITH OTHER PEOPLE: SOMEWHAT DIFFICULT
2. NOT BEING ABLE TO STOP OR CONTROL WORRYING: SEVERAL DAYS
GAD7 TOTAL SCORE: 5
1. FEELING NERVOUS, ANXIOUS, OR ON EDGE: MORE THAN HALF THE DAYS
3. WORRYING TOO MUCH ABOUT DIFFERENT THINGS: NOT AT ALL
5. BEING SO RESTLESS THAT IT IS HARD TO SIT STILL: NOT AT ALL
7. FEELING AFRAID AS IF SOMETHING AWFUL MIGHT HAPPEN: SEVERAL DAYS

## 2019-03-15 ASSESSMENT — PATIENT HEALTH QUESTIONNAIRE - PHQ9
5. POOR APPETITE OR OVEREATING: NOT AT ALL
SUM OF ALL RESPONSES TO PHQ QUESTIONS 1-9: 8

## 2019-03-16 ASSESSMENT — ANXIETY QUESTIONNAIRES: GAD7 TOTAL SCORE: 5

## 2019-07-10 ENCOUNTER — MYC MEDICAL ADVICE (OUTPATIENT)
Dept: FAMILY MEDICINE | Facility: CLINIC | Age: 24
End: 2019-07-10

## 2019-07-10 DIAGNOSIS — F32.A DEPRESSION, UNSPECIFIED DEPRESSION TYPE: ICD-10-CM

## 2019-07-10 NOTE — TELEPHONE ENCOUNTER
"Requested Prescriptions   Pending Prescriptions Disp Refills     sertraline (ZOLOFT) 100 MG tablet [Pharmacy Med Name: SERTRALINE  MG TABLET]  Last Written Prescription Date:  6/17/2019  Last Fill Quantity: 30 tablet,  # refills: 0   Last office visit: 9/20/2018 with prescribing provider:  Kendrick   Future Office Visit:     30 tablet 0     Sig: TAKE 1 TABLET BY MOUTH EVERY DAY       SSRIs Protocol Failed - 7/10/2019  9:32 AM        Failed - PHQ-9 score less than 5 in past 6 months     Please review last PHQ-9 score.   PHQ-9 SCORE 9/20/2018 3/15/2019   PHQ-9 Total Score 11 8     ALISA-7 SCORE 9/20/2018 3/15/2019   Total Score 7 5             Failed - Recent (6 mo) or future (30 days) visit within the authorizing provider's specialty     Patient had office visit in the last 6 months or has a visit in the next 30 days with authorizing provider or within the authorizing provider's specialty.  See \"Patient Info\" tab in inbasket, or \"Choose Columns\" in Meds & Orders section of the refill encounter.            Passed - Medication is active on med list        Passed - Patient is age 18 or older           "

## 2019-07-11 RX ORDER — SERTRALINE HYDROCHLORIDE 100 MG/1
TABLET, FILM COATED ORAL
Qty: 30 TABLET | Refills: 0 | OUTPATIENT
Start: 2019-07-11

## 2019-07-11 NOTE — TELEPHONE ENCOUNTER
[employer] message sent to patient asking to set up an appointment to discuss in further detail.

## 2019-07-11 NOTE — TELEPHONE ENCOUNTER
Routing refill request to provider for review/approval because:  Nani given x5 and patient did not follow up, please advise  Patient has been overdue for an office visit since March. Unable to reach patient through multiple phone call attempts and [employer] messages.

## 2019-07-11 NOTE — TELEPHONE ENCOUNTER
Already given shiela refill and did not follow up, no additional refills until seen.  Please call pt to schedule appointment.

## 2019-07-25 ENCOUNTER — OFFICE VISIT (OUTPATIENT)
Dept: FAMILY MEDICINE | Facility: CLINIC | Age: 24
End: 2019-07-25
Payer: COMMERCIAL

## 2019-07-25 VITALS
SYSTOLIC BLOOD PRESSURE: 130 MMHG | RESPIRATION RATE: 16 BRPM | WEIGHT: 288 LBS | HEIGHT: 73 IN | DIASTOLIC BLOOD PRESSURE: 72 MMHG | BODY MASS INDEX: 38.17 KG/M2 | TEMPERATURE: 97.6 F | HEART RATE: 74 BPM | OXYGEN SATURATION: 97 %

## 2019-07-25 DIAGNOSIS — R61 NIGHT SWEATS: ICD-10-CM

## 2019-07-25 DIAGNOSIS — F33.1 MODERATE EPISODE OF RECURRENT MAJOR DEPRESSIVE DISORDER (H): Primary | ICD-10-CM

## 2019-07-25 DIAGNOSIS — K58.8 OTHER IRRITABLE BOWEL SYNDROME: ICD-10-CM

## 2019-07-25 DIAGNOSIS — Z11.1 SCREENING FOR TUBERCULOSIS: ICD-10-CM

## 2019-07-25 DIAGNOSIS — E66.812 CLASS 2 OBESITY DUE TO EXCESS CALORIES WITHOUT SERIOUS COMORBIDITY WITH BODY MASS INDEX (BMI) OF 35.0 TO 35.9 IN ADULT: ICD-10-CM

## 2019-07-25 DIAGNOSIS — E66.09 CLASS 2 OBESITY DUE TO EXCESS CALORIES WITHOUT SERIOUS COMORBIDITY WITH BODY MASS INDEX (BMI) OF 35.0 TO 35.9 IN ADULT: ICD-10-CM

## 2019-07-25 PROCEDURE — 86580 TB INTRADERMAL TEST: CPT | Performed by: PHYSICIAN ASSISTANT

## 2019-07-25 PROCEDURE — 83516 IMMUNOASSAY NONANTIBODY: CPT | Performed by: PHYSICIAN ASSISTANT

## 2019-07-25 PROCEDURE — 99214 OFFICE O/P EST MOD 30 MIN: CPT | Performed by: PHYSICIAN ASSISTANT

## 2019-07-25 PROCEDURE — 83516 IMMUNOASSAY NONANTIBODY: CPT | Mod: 91 | Performed by: PHYSICIAN ASSISTANT

## 2019-07-25 PROCEDURE — 36415 COLL VENOUS BLD VENIPUNCTURE: CPT | Performed by: PHYSICIAN ASSISTANT

## 2019-07-25 RX ORDER — SERTRALINE HYDROCHLORIDE 100 MG/1
100 TABLET, FILM COATED ORAL DAILY
Qty: 90 TABLET | Refills: 3 | Status: SHIPPED | OUTPATIENT
Start: 2019-07-25 | End: 2020-04-01

## 2019-07-25 RX ORDER — BUPROPION HYDROCHLORIDE 150 MG/1
150 TABLET ORAL EVERY MORNING
Qty: 30 TABLET | Refills: 1 | Status: SHIPPED | OUTPATIENT
Start: 2019-07-25 | End: 2019-09-18

## 2019-07-25 RX ORDER — DICYCLOMINE HCL 20 MG
20 TABLET ORAL EVERY 6 HOURS
Qty: 120 TABLET | Refills: 3 | Status: SHIPPED | OUTPATIENT
Start: 2019-07-25 | End: 2020-04-01

## 2019-07-25 ASSESSMENT — ANXIETY QUESTIONNAIRES
1. FEELING NERVOUS, ANXIOUS, OR ON EDGE: SEVERAL DAYS
6. BECOMING EASILY ANNOYED OR IRRITABLE: NOT AT ALL
GAD7 TOTAL SCORE: 3
4. TROUBLE RELAXING: NOT AT ALL
7. FEELING AFRAID AS IF SOMETHING AWFUL MIGHT HAPPEN: NOT AT ALL
3. WORRYING TOO MUCH ABOUT DIFFERENT THINGS: SEVERAL DAYS
2. NOT BEING ABLE TO STOP OR CONTROL WORRYING: SEVERAL DAYS
5. BEING SO RESTLESS THAT IT IS HARD TO SIT STILL: NOT AT ALL
7. FEELING AFRAID AS IF SOMETHING AWFUL MIGHT HAPPEN: NOT AT ALL
GAD7 TOTAL SCORE: 3
GAD7 TOTAL SCORE: 3

## 2019-07-25 ASSESSMENT — MIFFLIN-ST. JEOR: SCORE: 2355.24

## 2019-07-25 ASSESSMENT — PATIENT HEALTH QUESTIONNAIRE - PHQ9
10. IF YOU CHECKED OFF ANY PROBLEMS, HOW DIFFICULT HAVE THESE PROBLEMS MADE IT FOR YOU TO DO YOUR WORK, TAKE CARE OF THINGS AT HOME, OR GET ALONG WITH OTHER PEOPLE: SOMEWHAT DIFFICULT
SUM OF ALL RESPONSES TO PHQ QUESTIONS 1-9: 4
SUM OF ALL RESPONSES TO PHQ QUESTIONS 1-9: 4

## 2019-07-25 NOTE — NURSING NOTE
The patient is asked the following questions today and these are his answers:    -Have you had a mantoux administered in the past 30 days?    No  -Have you had a previous positive Mantoux.  No  -Have you received BCG in the past.  No  -Have you had a live vaccine  (MMR, Varicella, OPV, Yellow Fever) in the last 6 weeks.  No  -Have you had and active  viral or bacterial infection in the past 6 weeks.  No  -Have you received corticosteroids or immunosuppressive agents in the past 6 weeks.  No  -Have you been diagnosed with HIV?  No  -Do you have a malignancy?  No    Mantoux Questionnaire: answers were all negative.

## 2019-07-25 NOTE — PROGRESS NOTES
Subjective     Shubham Castaneda is a 24 year old male who presents to clinic today for the following health issues:    HPI   Depression Followup    How are you doing with your depression since your last visit? No change/improved    Are you having other symptoms that might be associated with depression? No    Have you had a significant life event?  No     Are you feeling anxious or having panic attacks?   No    Do you have any concerns with your use of alcohol or other drugs? No    Social History     Tobacco Use     Smoking status: Never Smoker     Smokeless tobacco: Never Used   Substance Use Topics     Alcohol use: Yes     Comment: rare     Drug use: No     PHQ 9/20/2018 3/15/2019 7/25/2019   PHQ-9 Total Score 11 8 4   Q9: Thoughts of better off dead/self-harm past 2 weeks Not at all Not at all Not at all     ALISA-7 SCORE 9/20/2018 3/15/2019 7/25/2019   Total Score - - 3 (minimal anxiety)   Total Score 7 5 3     Suicide Assessment Five-step Evaluation and Treatment (SAFE-T)    Amount of exercise or physical activity: 4-5 days/week for an average of 45-60 minutes    Problems taking medications regularly: No    Medication side effects: none    Diet: regular (no restrictions)      Diarrhea  Onset: 4 years, ongoing    Description:   Consistency of stool: loose  Blood in stool: no but on toilet paper  Number of loose stools in past 24 hours: 3-4 daily    Progression of Symptoms:  worsening and same    Accompanying Signs & Symptoms:  Fever: no   Nausea or vomiting; no   Abdominal pain: no   Episodes of constipation: no   Weight loss: no     History:   Ill contacts: no   Recent use of antibiotics: no    Recent travels: no          Recent medication-new or changes(Rx or OTC): no     Precipitating factors:   n/a    Alleviating factors:   n/a    Therapies Tried and outcome:  Probiotic; Outcome: just restarted on these, probiotics seemed to make it worse  Recent Labs   Lab Test 09/20/18  1427 11/22/17  1101   * 112*   HDL  "26* 30*   TRIG 340* 301*   ALT 88* 89*   CR 0.99 0.90   GFRESTIMATED >90 >90   GFRESTBLACK >90 >90   POTASSIUM 3.9 4.2   TSH 1.54  --       BP Readings from Last 3 Encounters:   07/25/19 130/72   09/20/18 130/70   03/19/18 138/78    Wt Readings from Last 3 Encounters:   07/25/19 130.6 kg (288 lb)   09/20/18 123.4 kg (272 lb)   03/19/18 123.4 kg (272 lb)         Reviewed and updated as needed this visit by Provider  Tobacco  Allergies  Meds  Problems  Med Hx  Surg Hx  Fam Hx  Soc Hx          Additional complaints: Weight, had discussed with mother adding on wellbutrin.    HPI additional notes: Shubham presents today with   Chief Complaint   Patient presents with     Depression     Diarrhea   Has never been tested for celiac but has been told her has a slight allergy to gluten.  Has eaten gluten in the last week.         Review of Systems   C: Negative for fever or chills. Positive for weight gain unintentional  Skin: Negative for worrisome rashes or lesions  ENT: Negative for ear, mouth and throat problems  Resp: Negative for significant cough or SOB  CV: Negative for chest pain or peripheral edema  GI:POSITIVE for abdominal pain generalized and diarrhea  MS: Negative for significant arthralgias or myalgias  ENDO: POSITIVE  for night sweats  P: Negative for changes in mood or affect  ROS all other systems negative.        Objective    /72   Pulse 74   Temp 97.6  F (36.4  C) (Tympanic)   Resp 16   Ht 1.854 m (6' 1\")   Wt 130.6 kg (288 lb)   SpO2 97%   BMI 38.00 kg/m    Body mass index is 38 kg/m .  Physical Exam   GENERAL: healthy, alert, in no acute distress  SKIN: no suspicious lesions, no rashes  PSYCH:Mental Status Exam  Behavior: cooperative, pleasant and calm  Speech: normal rate and rhythm  Mood: description consistent with euthymia  Affect: Appropriate/mood-congruent  Thought Processes: Logical and Goal directed  Thought Content: no evidence of suicidal or homicidal ideation and no overt " "psychosis  Insight: Good  Judgment: Good      Diagnostic test results:  Pending        Assessment & Plan     BMI:   Estimated body mass index is 38 kg/m  as calculated from the following:    Height as of this encounter: 1.854 m (6' 1\").    Weight as of this encounter: 130.6 kg (288 lb).   Weight management plan: Discussed healthy diet and exercise guidelines        ICD-10-CM    1. Moderate episode of recurrent major depressive disorder (H) F33.1 buPROPion (WELLBUTRIN XL) 150 MG 24 hr tablet     sertraline (ZOLOFT) 100 MG tablet   2. Other irritable bowel syndrome K58.8 Tissue transglutaminase stephanie IgA and IgG     dicyclomine (BENTYL) 20 MG tablet   3. Screening for tuberculosis Z11.1 TB INTRADERMAL TEST   4. Class 2 obesity due to excess calories without serious comorbidity with body mass index (BMI) of 35.0 to 35.9 in adult E66.09     Z68.35    5. Night sweats R61      Other irritable bowel syndrome  - Will check for celiac today as he has never been tested.  - Start bentyl.  - Start metamucil daily.  Increase fluids.  - Follow up for recheck in 1 month.    Class 2 obesity due to excess calories without serious comorbidity with body mass index (BMI) of 35.0 to 35.9 in adult  - Will try adding on wellbutrin to see if that helps with weight.    Moderate episode of recurrent major depressive disorder (H)  - Well controlled by zoloft but will add on wellbutrin to see if helps with weight and IBS as well.    Night sweats  - TSH was normal last time, will check A1c.  - Discussed possibly using TCA for side effects to see if it decreases sweating.  Would not want to start this at the same time as wellbutrin, will address next month.    Please see patient instructions for treatment details.    Return in about 1 month (around 8/25/2019) for Med Check.    Azra Morrell PA-C  [facility]      "

## 2019-07-25 NOTE — ASSESSMENT & PLAN NOTE
- Well controlled by zoloft but will add on wellbutrin to see if helps with weight and IBS as well.

## 2019-07-25 NOTE — ASSESSMENT & PLAN NOTE
- Will check for celiac today as he has never been tested.  - Start bentyl.  - Start metamucil daily.  Increase fluids.  - Follow up for recheck in 1 month.

## 2019-07-25 NOTE — ASSESSMENT & PLAN NOTE
- TSH was normal last time, will check A1c.  - Discussed possibly using TCA for side effects to see if it decreases sweating.  Would not want to start this at the same time as wellbutrin, will address next month.

## 2019-07-25 NOTE — PATIENT INSTRUCTIONS
Irritable bowel syndrome    Frequent stomach cramps, bloating, and diarrhea or constipation with passing of mucus. This collection of symptoms used to be called a nervous stomach or a spastic colon, but now we know it by the more descriptive name of irritable bowel syndrome, or IBS. One of the most common reasons people visit the doctor, IBS affects about one in five persons in the [place] and can occur in children as well as adults.   What is IBS?  It is a group of uncomfortable--and sometimes disabling--symptoms that affect the large intestine, or colon. The abdominal pain, spasms, gas, and bloating can range from mild to so severe that the person can't work.   Women are two to three times more likely than men to have this condition, which usually begins around age 20 years. The good news is that IBS does not indicate damage to the colon or lead to bleeding or any other serious disease such as cancer.   What causes IBS?  No one knows exactly why some people have a more sensitive colon than others. However, it appears that certain foods and stressful situations trigger the symptoms.   In a healthy colon, food moves along through a series of gentle contractions. In IBS, the colon contractions are not regular. The diarrhea and bowel urgency associated with IBS are thought to be caused by the colon moving food too quickly, overwhelming the ability of the colon lining to absorb the fluid passing through. As a result, there is too much fluid in the stool. The constipation is believed to be caused by the colon moving food too slowly, giving the colon lining too much time to absorb fluid that normally keeps the stool soft.   How is it diagnosed?   Physical examination and a history of symptoms help rule out conditions that may have symptoms similar to those of IBS, such as inflammatory bowel disease (ulcerative colitis and Crohn's disease) and lactose intolerance. Fever, weight loss, blood in the stool, and  persistent severe pain are not associated with IBS and may indicate a more serious problem. Diagnostic tests, such as an x-ray, blood tests, or endoscopy (visualization of the colon using a flexible tube passed through the rectum and into the colon), may be performed.   For symptoms to be diagnosed as IBS, the abdominal pain must have been present for at least 12 weeks out of the last year. The pain also must be relieved by having a bowel movement, and the bowel movement must either look different than normal or come more or less often than usual.   How is IBS treated?  There is no cure, but symptoms of IBS can usually be controlled through diet and stress management. Under the guidance of a physician or dietitian, foods that commonly irritate the colon, such as wheat, rye, barley, chocolate, and dairy products, usually are eliminated from the diet one by one to see if symptoms disappear. If symptoms don't disappear and the food doesn't seem to be responsible for symptoms, it can gradually be reintroduced into the diet.   Drinking six to eight glasses of water daily and eating high-fiber, low-fat foods (for example, fruits, vegetables, and whole-grain breads and cereals) are recommended. Drinking carbonated beverages, alcohol, and caffeinated beverages; eating high-fat and spicy foods; and smoking are discouraged. Some foods, such as beans, cabbage, and cauliflower, naturally produce gas and should be limited or avoided. Meals that are small, regular, and frequent are encouraged because they are easier to digest than large ones.   Stress-reduction techniques, such as meditation, biofeedback, cognitive behavior therapy, and yoga or other exercise, may also help reduce the severity and frequency of symptoms.   If such efforts aren't effective, medications or supplements may be prescribed. Drugs that are sometimes used include fiber supplements, laxatives, antidiarrheal medicines, medications used to calm colon spasms,  and antidepressants.   Symptoms of IBS usually come and go over a lifetime, and what helps one person may not help another. It may take some time to find out what works for you, but once you do, it will be easier to prevent and manage attacks when they do occur.

## 2019-07-26 LAB
TTG IGA SER-ACNC: <1 U/ML
TTG IGG SER-ACNC: <1 U/ML

## 2019-07-26 ASSESSMENT — ANXIETY QUESTIONNAIRES: GAD7 TOTAL SCORE: 3

## 2019-07-27 ENCOUNTER — OFFICE VISIT (OUTPATIENT)
Dept: FAMILY MEDICINE | Facility: CLINIC | Age: 24
End: 2019-07-27
Payer: COMMERCIAL

## 2019-07-27 DIAGNOSIS — Z11.1 SCREENING EXAMINATION FOR PULMONARY TUBERCULOSIS: Primary | ICD-10-CM

## 2019-07-27 LAB
PPDINDURATION: 0 MM (ref 0–5)
PPDREDNESS: 0 MM

## 2019-07-27 PROCEDURE — 99211 OFF/OP EST MAY X REQ PHY/QHP: CPT | Performed by: PHYSICIAN ASSISTANT

## 2019-07-29 NOTE — RESULT ENCOUNTER NOTE
Ole Jalloh,    I just wanted to let you know that your lab results have been reviewed and are attached.    - Your tissue transglutaminase levels were normal.  You do not have Celiac Disease (gluten intolerance).  Although you still may want to avoid gluten as you mentioned your gluten allergy.    Please let me know if you have any questions and have a great week!    Sincerely,    Claritza Morrell PA-C    [facility]  [address]  [place] [address] [phone] (p)

## 2019-08-16 DIAGNOSIS — F33.1 MODERATE EPISODE OF RECURRENT MAJOR DEPRESSIVE DISORDER (H): ICD-10-CM

## 2019-08-16 RX ORDER — BUPROPION HYDROCHLORIDE 150 MG/1
150 TABLET ORAL EVERY MORNING
Qty: 30 TABLET | Refills: 1 | OUTPATIENT
Start: 2019-08-16

## 2019-08-16 NOTE — TELEPHONE ENCOUNTER
"Requested Prescriptions   Pending Prescriptions Disp Refills     buPROPion (WELLBUTRIN XL) 150 MG 24 hr tablet [Pharmacy Med Name: BUPROPION HCL  MG TABLET]  Last Written Prescription Date:  7/25/19  Last Fill Quantity: 30,  # refills: 1   Last office visit: 7/27/2019 with prescribing provider:  efren   Future Office Visit:    PHQ-9 SCORE 9/20/2018 3/15/2019 7/25/2019   PHQ-9 Total Score MyChart - - 4 (Minimal depression)   PHQ-9 Total Score 11 8 4        30 tablet 1     Sig: TAKE 1 TABLET (150 MG) BY MOUTH EVERY MORNING       SSRIs Protocol Passed - 8/16/2019  7:31 AM        Passed - PHQ-9 score less than 5 in past 6 months     Please review last PHQ-9 score.           Passed - Medication is Bupropion     If the medication is Bupropion (Wellbutrin), and the patient is taking for smoking cessation; OK to refill.          Passed - Medication is active on med list        Passed - Patient is age 18 or older        Passed - Recent (6 mo) or future (30 days) visit within the authorizing provider's specialty     Patient had office visit in the last 6 months or has a visit in the next 30 days with authorizing provider or within the authorizing provider's specialty.  See \"Patient Info\" tab in inbasket, or \"Choose Columns\" in Meds & Orders section of the refill encounter.              "

## 2019-08-16 NOTE — TELEPHONE ENCOUNTER
Patient Contact    Attempt # 1    Was call answered?  No.  Left message on voicemail with information to call triage back.    Upon callback, schedule 1 month follow-up appointment.    Pharmacy Contact    Attempt # 1    Called to confirm refill for rx on file. They state he is requesting 90 day. Notified follow-up required before 90 day supply can be given. Rx duplicate, rx denied.

## 2019-08-24 DIAGNOSIS — F33.1 MODERATE EPISODE OF RECURRENT MAJOR DEPRESSIVE DISORDER (H): ICD-10-CM

## 2019-08-26 RX ORDER — BUPROPION HYDROCHLORIDE 150 MG/1
150 TABLET ORAL EVERY MORNING
Qty: 30 TABLET | Refills: 1 | OUTPATIENT
Start: 2019-08-26

## 2019-08-26 NOTE — TELEPHONE ENCOUNTER
Called pharmacy to verify rx on file. Duplicate request, rx denied.    Sent [employer] message to notify patient due for med check.

## 2019-08-26 NOTE — TELEPHONE ENCOUNTER
"Requested Prescriptions   Pending Prescriptions Disp Refills     buPROPion (WELLBUTRIN XL) 150 MG 24 hr tablet 30 tablet 1     Sig: Take 1 tablet (150 mg) by mouth every morning                      Last Written Prescription Date:  7/25/2019  Last Fill Quantity: 30,  # refills: 1   Last office visit: 7/27/2019 with prescribing provider:  Claritza Morrell  Future Office Visit:  none       SSRIs Protocol Passed - 8/24/2019  9:25 AM        Passed - PHQ-9 score less than 5 in past 6 months     Please review last PHQ-9 score.           Passed - Medication is Bupropion     If the medication is Bupropion (Wellbutrin), and the patient is taking for smoking cessation; OK to refill.          Passed - Medication is active on med list        Passed - Patient is age 18 or older        Passed - Recent (6 mo) or future (30 days) visit within the authorizing provider's specialty     Patient had office visit in the last 6 months or has a visit in the next 30 days with authorizing provider or within the authorizing provider's specialty.  See \"Patient Info\" tab in inbasket, or \"Choose Columns\" in Meds & Orders section of the refill encounter.            PHQ-9 SCORE 9/20/2018 3/15/2019 7/25/2019   PHQ-9 Total Score MyChart - - 4 (Minimal depression)   PHQ-9 Total Score 11 8 4     "

## 2019-09-18 DIAGNOSIS — F33.1 MODERATE EPISODE OF RECURRENT MAJOR DEPRESSIVE DISORDER (H): ICD-10-CM

## 2019-09-19 RX ORDER — BUPROPION HYDROCHLORIDE 150 MG/1
150 TABLET ORAL EVERY MORNING
Qty: 30 TABLET | Refills: 1 | Status: SHIPPED | OUTPATIENT
Start: 2019-09-19 | End: 2019-10-22

## 2019-09-19 NOTE — TELEPHONE ENCOUNTER
Overdue for office visit, only 1 month  supply submitted.   No additional refills until seen in clinic.     Please call and inform pt to schedule a medication check

## 2019-09-19 NOTE — TELEPHONE ENCOUNTER
"Requested Prescriptions   Pending Prescriptions Disp Refills     buPROPion (WELLBUTRIN XL) 150 MG 24 hr tablet [Pharmacy Med Name: BUPROPION HCL  MG TABLET]  Last Written Prescription Date:  7/25/2019  Last Fill Quantity: 30 tablet,  # refills: 1   Last office visit: 7/27/2019 with prescribing provider:  Brittani   Future Office Visit:     30 tablet 1     Sig: TAKE 1 TABLET (150 MG) BY MOUTH EVERY MORNING       SSRIs Protocol Passed - 9/18/2019 11:25 AM        Passed - PHQ-9 score less than 5 in past 6 months     Please review last PHQ-9 score.   PHQ-9 SCORE 9/20/2018 3/15/2019 7/25/2019   PHQ-9 Total Score MyChart - - 4 (Minimal depression)   PHQ-9 Total Score 11 8 4     ALISA-7 SCORE 9/20/2018 3/15/2019 7/25/2019   Total Score - - 3 (minimal anxiety)   Total Score 7 5 3             Passed - Medication is Bupropion     If the medication is Bupropion (Wellbutrin), and the patient is taking for smoking cessation; OK to refill.          Passed - Medication is active on med list        Passed - Patient is age 18 or older        Passed - Recent (6 mo) or future (30 days) visit within the authorizing provider's specialty     Patient had office visit in the last 6 months or has a visit in the next 30 days with authorizing provider or within the authorizing provider's specialty.  See \"Patient Info\" tab in inbasket, or \"Choose Columns\" in Meds & Orders section of the refill encounter.               "

## 2019-09-19 NOTE — TELEPHONE ENCOUNTER
Routing refill request to provider for review/approval because:  Due for med check  Nani given x 1

## 2019-10-22 DIAGNOSIS — F33.1 MODERATE EPISODE OF RECURRENT MAJOR DEPRESSIVE DISORDER (H): ICD-10-CM

## 2019-10-22 NOTE — TELEPHONE ENCOUNTER
"Requested Prescriptions   Pending Prescriptions Disp Refills     buPROPion (WELLBUTRIN XL) 150 MG 24 hr tablet [Pharmacy Med Name: BUPROPION HCL  MG TABLET]  Last Written Prescription Date:  9/19/2019  Last Fill Quantity: 30 tablet,  # refills: 1   Last office visit: 7/27/2019 with prescribing provider:  Brittani   Future Office Visit:     30 tablet 1     Sig: TAKE 1 TABLET (150 MG) BY MOUTH EVERY MORNING       SSRIs Protocol Passed - 10/22/2019  7:32 AM        Passed - PHQ-9 score less than 5 in past 6 months     Please review last PHQ-9 score.   PHQ-9 SCORE 9/20/2018 3/15/2019 7/25/2019   PHQ-9 Total Score MyChart - - 4 (Minimal depression)   PHQ-9 Total Score 11 8 4     ALISA-7 SCORE 9/20/2018 3/15/2019 7/25/2019   Total Score - - 3 (minimal anxiety)   Total Score 7 5 3             Passed - Medication is Bupropion     If the medication is Bupropion (Wellbutrin), and the patient is taking for smoking cessation; OK to refill.          Passed - Medication is active on med list        Passed - Patient is age 18 or older        Passed - Recent (6 mo) or future (30 days) visit within the authorizing provider's specialty     Patient had office visit in the last 6 months or has a visit in the next 30 days with authorizing provider or within the authorizing provider's specialty.  See \"Patient Info\" tab in inbasket, or \"Choose Columns\" in Meds & Orders section of the refill encounter.               "

## 2019-10-23 RX ORDER — BUPROPION HYDROCHLORIDE 150 MG/1
150 TABLET ORAL EVERY MORNING
Qty: 30 TABLET | Refills: 2 | Status: SHIPPED | OUTPATIENT
Start: 2019-10-23 | End: 2020-03-03

## 2019-11-15 DIAGNOSIS — F33.1 MODERATE EPISODE OF RECURRENT MAJOR DEPRESSIVE DISORDER (H): ICD-10-CM

## 2019-11-15 NOTE — TELEPHONE ENCOUNTER
"Requested Prescriptions   Pending Prescriptions Disp Refills     buPROPion (WELLBUTRIN XL) 150 MG 24 hr tablet [Pharmacy Med Name: BUPROPION HCL  MG TABLET]  Last Written Prescription Date:  10/23/2019  Last Fill Quantity: 30 tablet,  # refills: 2   Last office visit: 7/27/2019 with prescribing provider:  Brittani   Future Office Visit:     30 tablet 2     Sig: TAKE 1 TABLET (150 MG) BY MOUTH EVERY MORNING       SSRIs Protocol Passed - 11/15/2019 10:37 AM        Passed - PHQ-9 score less than 5 in past 6 months     Please review last PHQ-9 score.   PHQ-9 SCORE 9/20/2018 3/15/2019 7/25/2019   PHQ-9 Total Score MyChart - - 4 (Minimal depression)   PHQ-9 Total Score 11 8 4     ALISA-7 SCORE 9/20/2018 3/15/2019 7/25/2019   Total Score - - 3 (minimal anxiety)   Total Score 7 5 3             Passed - Medication is Bupropion     If the medication is Bupropion (Wellbutrin), and the patient is taking for smoking cessation; OK to refill.          Passed - Medication is active on med list        Passed - Patient is age 18 or older        Passed - Recent (6 mo) or future (30 days) visit within the authorizing provider's specialty     Patient had office visit in the last 6 months or has a visit in the next 30 days with authorizing provider or within the authorizing provider's specialty.  See \"Patient Info\" tab in inbasket, or \"Choose Columns\" in Meds & Orders section of the refill encounter.               "

## 2019-11-18 RX ORDER — BUPROPION HYDROCHLORIDE 150 MG/1
150 TABLET ORAL EVERY MORNING
Qty: 30 TABLET | Refills: 2 | OUTPATIENT
Start: 2019-11-18

## 2020-01-02 DIAGNOSIS — F33.1 MODERATE EPISODE OF RECURRENT MAJOR DEPRESSIVE DISORDER (H): ICD-10-CM

## 2020-01-02 NOTE — TELEPHONE ENCOUNTER
"Requested Prescriptions   Pending Prescriptions Disp Refills     buPROPion (WELLBUTRIN XL) 150 MG 24 hr tablet [Pharmacy Med Name: BUPROPION HCL  MG TABLET]    Last Written Prescription Date:  10/23/2019  Last Fill Quantity: 30 tablet,  # refills: 2   Last office visit: 7/27/2019 with prescribing provider:  Brittani   Future Office Visit:     30 tablet 2     Sig: TAKE 1 TABLET (150 MG) BY MOUTH EVERY MORNING       SSRIs Protocol Passed - 1/2/2020 12:37 PM        Passed - PHQ-9 score less than 5 in past 6 months     Please review last PHQ-9 score.   PHQ-9 SCORE 9/20/2018 3/15/2019 7/25/2019   PHQ-9 Total Score MyChart - - 4 (Minimal depression)   PHQ-9 Total Score 11 8 4               Passed - Medication is Bupropion     If the medication is Bupropion (Wellbutrin), and the patient is taking for smoking cessation; OK to refill.          Passed - Medication is active on med list        Passed - Patient is age 18 or older        Passed - Recent (6 mo) or future (30 days) visit within the authorizing provider's specialty     Patient had office visit in the last 6 months or has a visit in the next 30 days with authorizing provider or within the authorizing provider's specialty.  See \"Patient Info\" tab in inbasket, or \"Choose Columns\" in Meds & Orders section of the refill encounter.               "

## 2020-01-03 RX ORDER — BUPROPION HYDROCHLORIDE 150 MG/1
150 TABLET ORAL EVERY MORNING
Qty: 30 TABLET | Refills: 2 | OUTPATIENT
Start: 2020-01-03

## 2020-01-03 NOTE — TELEPHONE ENCOUNTER
Sent [employer] message with PHQ9 screening.     Patient Contact    Called patient and notified of need for OV. He states that he has enough medication right now. He will call to schedule OV when he needs more refills.     Rx denied, requested too early.

## 2020-03-02 DIAGNOSIS — F33.1 MODERATE EPISODE OF RECURRENT MAJOR DEPRESSIVE DISORDER (H): ICD-10-CM

## 2020-03-02 NOTE — TELEPHONE ENCOUNTER
"Requested Prescriptions   Pending Prescriptions Disp Refills     buPROPion (WELLBUTRIN XL) 150 MG 24 hr tablet [Pharmacy Med Name: BUPROPION HCL  MG TABLET]  Last Written Prescription Date:  10/23/2019  Last Fill Quantity: 30 tablet,  # refills: 2   Last office visit: 7/27/2019 with prescribing provider:  Brittani   Future Office Visit:     30 tablet 0     Sig: TAKE 1 TABLET (150 MG) BY MOUTH EVERY MORNING       SSRIs Protocol Failed - 3/2/2020  1:45 AM        Failed - PHQ-9 score less than 5 in past 6 months     Please review last PHQ-9 score.   PHQ-9 SCORE 9/20/2018 3/15/2019 7/25/2019   PHQ-9 Total Score MyChart - - 4 (Minimal depression)   PHQ-9 Total Score 11 8 4     ALISA-7 SCORE 9/20/2018 3/15/2019 7/25/2019   Total Score - - 3 (minimal anxiety)   Total Score 7 5 3             Failed - Recent (6 mo) or future (30 days) visit within the authorizing provider's specialty     Patient had office visit in the last 6 months or has a visit in the next 30 days with authorizing provider or within the authorizing provider's specialty.  See \"Patient Info\" tab in inbasket, or \"Choose Columns\" in Meds & Orders section of the refill encounter.            Passed - Medication is Bupropion     If the medication is Bupropion (Wellbutrin), and the patient is taking for smoking cessation; OK to refill.          Passed - Medication is active on med list        Passed - Patient is age 18 or older           "

## 2020-03-03 NOTE — TELEPHONE ENCOUNTER
Routing refill request to provider for review/approval because:  Pt has not been seen in 6 mo  Depression screening not up to date

## 2020-03-04 RX ORDER — BUPROPION HYDROCHLORIDE 150 MG/1
150 TABLET ORAL EVERY MORNING
Qty: 30 TABLET | Refills: 0 | Status: SHIPPED | OUTPATIENT
Start: 2020-03-04 | End: 2020-04-01

## 2020-03-04 NOTE — TELEPHONE ENCOUNTER
Overdue for office visit, only 1 month  supply submitted.   No additional refills until seen in clinic.     Please call and inform pt to schedule an office visit

## 2020-03-04 NOTE — TELEPHONE ENCOUNTER
Patient Contact    Attempt # 1    Was call answered?  No.  Left message on voicemail with information to call me back.    On call back:    -Pt needs to schedule OV

## 2020-03-05 NOTE — TELEPHONE ENCOUNTER
RN contacted pt.    Pt will schedule appt online at his convenience.    No further action needed at this time.

## 2020-03-31 DIAGNOSIS — F33.1 MODERATE EPISODE OF RECURRENT MAJOR DEPRESSIVE DISORDER (H): ICD-10-CM

## 2020-03-31 RX ORDER — BUPROPION HYDROCHLORIDE 150 MG/1
TABLET ORAL
Qty: 30 TABLET | Refills: 0 | OUTPATIENT
Start: 2020-03-31

## 2020-03-31 NOTE — TELEPHONE ENCOUNTER
Patient Contact    Called patient. He already has appt scheduled for tomorrow. No further action needed.

## 2020-03-31 NOTE — TELEPHONE ENCOUNTER
It looks like we have recommended several times since August 2019 to follow up at the clinic.  He needs to schedule a virtual visit for this medication.

## 2020-04-01 ENCOUNTER — VIRTUAL VISIT (OUTPATIENT)
Dept: FAMILY MEDICINE | Facility: CLINIC | Age: 25
End: 2020-04-01
Payer: COMMERCIAL

## 2020-04-01 DIAGNOSIS — F33.1 MODERATE EPISODE OF RECURRENT MAJOR DEPRESSIVE DISORDER (H): ICD-10-CM

## 2020-04-01 PROCEDURE — 99213 OFFICE O/P EST LOW 20 MIN: CPT | Mod: TEL | Performed by: FAMILY MEDICINE

## 2020-04-01 RX ORDER — SERTRALINE HYDROCHLORIDE 100 MG/1
100 TABLET, FILM COATED ORAL DAILY
Qty: 90 TABLET | Refills: 2 | Status: SHIPPED | OUTPATIENT
Start: 2020-04-01 | End: 2020-11-05

## 2020-04-01 RX ORDER — BUPROPION HYDROCHLORIDE 150 MG/1
150 TABLET ORAL EVERY MORNING
Qty: 90 TABLET | Refills: 2 | Status: SHIPPED | OUTPATIENT
Start: 2020-04-01 | End: 2021-01-22

## 2020-04-01 ASSESSMENT — PATIENT HEALTH QUESTIONNAIRE - PHQ9: SUM OF ALL RESPONSES TO PHQ QUESTIONS 1-9: 2

## 2020-04-01 NOTE — PROGRESS NOTES
"Subjective     Shubham Castaneda is a 25 year old male who is being evaluated via a billable telephone visit.      The patient has been notified of following:     \"This telephone visit will be conducted via a call between you and your physician/provider. We have found that certain health care needs can be provided without the need for a physical exam.  This service lets us provide the care you need with a short phone conversation.  If a prescription is necessary we can send it directly to your pharmacy.  If lab work is needed we can place an order for that and you can then stop by our lab to have the test done at a later time.    If during the course of the call the physician/provider feels a telephone visit is not appropriate, you will not be charged for this service.\"     Patient has given verbal consent for Telephone visit?  Yes    Shubham Castaneda complains of Depression follow up    ALLERGIES  Gluten meal    Depression Followup    How are you doing with your depression since your last visit? Improved     Are you having other symptoms that might be associated with depression? Yes:  Anxiety    Have you had a significant life event?  No     Are you feeling anxious or having panic attacks?   No    Do you have any concerns with your use of alcohol or other drugs? No    Social History     Tobacco Use     Smoking status: Never Smoker     Smokeless tobacco: Never Used   Substance Use Topics     Alcohol use: Yes     Comment: rare     Drug use: No     PHQ 3/15/2019 7/25/2019 4/1/2020   PHQ-9 Total Score 8 4 2   Q9: Thoughts of better off dead/self-harm past 2 weeks Not at all Not at all Not at all     ALISA-7 SCORE 9/20/2018 3/15/2019 7/25/2019   Total Score - - 3 (minimal anxiety)   Total Score 7 5 3     Last PHQ-9 4/1/2020   1.  Little interest or pleasure in doing things 0   2.  Feeling down, depressed, or hopeless 1   3.  Trouble falling or staying asleep, or sleeping too much 0   4.  Feeling tired or having little energy 0 "   5.  Poor appetite or overeating 0   6.  Feeling bad about yourself 0   7.  Trouble concentrating 1   8.  Moving slowly or restless 0   Q9: Thoughts of better off dead/self-harm past 2 weeks 0   PHQ-9 Total Score 2   Difficulty at work, home, or with people Somewhat difficult         Suicide Assessment Five-step Evaluation and Treatment (SAFE-T)      How many servings of fruits and vegetables do you eat daily?  2-3    On average, how many sweetened beverages do you drink each day (Examples: soda, juice, sweet tea, etc.  Do NOT count diet or artificially sweetened beverages)?   1    How many days per week do you exercise enough to make your heart beat faster? 5    How many minutes a day do you exercise enough to make your heart beat faster? 60 or more    How many days per week do you miss taking your medication? 0    Wellbutrin---started this med last July 2019.  Works well. No issues or side effects.    Sertraline...has been on this med for a longer time.  Working well for him; would like to keep it at same dose.     Reviewed and updated as needed this visit by Provider  Tobacco  Allergies  Meds  Problems  Med Hx  Surg Hx  Fam Hx         Review of Systems   ROS COMP: CONSTITUTIONAL: NEGATIVE for fever, chills, change in weight  EYES: NEGATIVE for vision changes or irritation  ENT/MOUTH: NEGATIVE for ear, mouth and throat problems  RESP: NEGATIVE for significant cough or SOB  CV: NEGATIVE for chest pain, palpitations or peripheral edema  GI: NEGATIVE for nausea, abdominal pain, heartburn, or change in bowel habits  MUSCULOSKELETAL: NEGATIVE for significant arthralgias or myalgia       Objective   Reported vitals:  There were no vitals taken for this visit.   Unable to do physical exam or take vitals since this is a Telephone visit.    Diagnostic Test Results:  Labs reviewed in Epic        Assessment/Plan:  1. Moderate episode of recurrent major depressive disorder (H)  - buPROPion (WELLBUTRIN XL) 150 MG 24 hr  tablet; Take 1 tablet (150 mg) by mouth every morning  Dispense: 90 tablet; Refill: 2  - sertraline (ZOLOFT) 100 MG tablet; Take 1 tablet (100 mg) by mouth daily  Dispense: 90 tablet; Refill: 2    Mood fairly well-controlled despite COVID-19 pandemic.  Will continue current medication regimen; refills provided.    A physical exam with vitals was not possible today due to the COVID19 pandemic crisis for which we are trying to keep all patients safe and at home.  Clinical decision making was based on history as presented by the patient and answers to follow up questions as noted above.  Any lab orders that are needed will be put in as future orders so that the patient can come in for a lab only visit to minimize the amount of time spent in the clinic.    Return in about 6 months (around 10/1/2020) for re-check / follow-up (routein medication check).      Phone call duration:  12 minutes    Jenifer Marks DO

## 2021-01-22 ENCOUNTER — OFFICE VISIT (OUTPATIENT)
Dept: FAMILY MEDICINE | Facility: CLINIC | Age: 26
End: 2021-01-22
Payer: COMMERCIAL

## 2021-01-22 VITALS
SYSTOLIC BLOOD PRESSURE: 138 MMHG | OXYGEN SATURATION: 98 % | TEMPERATURE: 98.1 F | DIASTOLIC BLOOD PRESSURE: 77 MMHG | BODY MASS INDEX: 35.09 KG/M2 | WEIGHT: 266 LBS | HEART RATE: 67 BPM

## 2021-01-22 DIAGNOSIS — F33.1 MODERATE EPISODE OF RECURRENT MAJOR DEPRESSIVE DISORDER (H): ICD-10-CM

## 2021-01-22 DIAGNOSIS — F41.9 ANXIETY: Primary | ICD-10-CM

## 2021-01-22 PROCEDURE — 99213 OFFICE O/P EST LOW 20 MIN: CPT | Performed by: NURSE PRACTITIONER

## 2021-01-22 RX ORDER — BUPROPION HYDROCHLORIDE 150 MG/1
150 TABLET ORAL EVERY MORNING
Qty: 90 TABLET | Refills: 1 | Status: SHIPPED | OUTPATIENT
Start: 2021-01-22 | End: 2021-07-22

## 2021-01-22 RX ORDER — SERTRALINE HYDROCHLORIDE 100 MG/1
100 TABLET, FILM COATED ORAL DAILY
Qty: 90 TABLET | Refills: 1 | Status: SHIPPED | OUTPATIENT
Start: 2021-01-22 | End: 2021-07-22

## 2021-01-22 ASSESSMENT — ANXIETY QUESTIONNAIRES
IF YOU CHECKED OFF ANY PROBLEMS ON THIS QUESTIONNAIRE, HOW DIFFICULT HAVE THESE PROBLEMS MADE IT FOR YOU TO DO YOUR WORK, TAKE CARE OF THINGS AT HOME, OR GET ALONG WITH OTHER PEOPLE: VERY DIFFICULT
5. BEING SO RESTLESS THAT IT IS HARD TO SIT STILL: MORE THAN HALF THE DAYS
7. FEELING AFRAID AS IF SOMETHING AWFUL MIGHT HAPPEN: NOT AT ALL
3. WORRYING TOO MUCH ABOUT DIFFERENT THINGS: SEVERAL DAYS
GAD7 TOTAL SCORE: 6
6. BECOMING EASILY ANNOYED OR IRRITABLE: MORE THAN HALF THE DAYS
1. FEELING NERVOUS, ANXIOUS, OR ON EDGE: SEVERAL DAYS
2. NOT BEING ABLE TO STOP OR CONTROL WORRYING: NOT AT ALL

## 2021-01-22 ASSESSMENT — PATIENT HEALTH QUESTIONNAIRE - PHQ9
SUM OF ALL RESPONSES TO PHQ QUESTIONS 1-9: 4
5. POOR APPETITE OR OVEREATING: NOT AT ALL

## 2021-01-22 NOTE — PROGRESS NOTES
Assessment & Plan     Moderate episode of recurrent major depressive disorder (H)    - sertraline (ZOLOFT) 100 MG tablet; Take 1 tablet (100 mg) by mouth daily    ANXIETY    - buPROPion (WELLBUTRIN XL) 150 MG 24 hr tablet; Take 1 tablet (150 mg) by mouth every morning      He plans to establish with new PCP soon for future refills     NATHAN Keene CNP  [facility] DANIEL Jalloh is a 25 year old who presents to clinic today for the following health issues     HPI     Follow up on sertraline and refills, discuss possibly increasing dosage of medication.  But adds that his depression is definitely under control at this time  He has been back in the office of late and feels like he just can't sit still, fidgety  ALISA Is 6/21  Was previously on welbutrin xl 24 but doesn' recall why and doesn't remember when or why he stopped- possibly    Expense.  He would like to try that again    Review of Systems   Constitutional, HEENT, cardiovascular, pulmonary, gi and gu systems are negative, except as otherwise noted.      Objective      /77 (BP Location: Right arm, Patient Position: Sitting, Cuff Size: Adult Large)   Pulse 67   Temp 98.1  F (36.7  C)   Wt 120.7 kg (266 lb)   SpO2 98%   BMI 35.09 kg/m      Physical Exam   GENERAL: overweight, alert and no distress  EYES: Eyes grossly normal to inspection, PERRL and conjunctivae and sclerae normal

## 2021-01-23 ASSESSMENT — ANXIETY QUESTIONNAIRES: GAD7 TOTAL SCORE: 6

## 2021-07-19 DIAGNOSIS — F41.9 ANXIETY: ICD-10-CM

## 2021-07-19 DIAGNOSIS — F33.1 MODERATE EPISODE OF RECURRENT MAJOR DEPRESSIVE DISORDER (H): ICD-10-CM

## 2021-07-19 NOTE — TELEPHONE ENCOUNTER
Former [facility] patient  LOV 1- Rosie, patient was to establish care with new PCP after previous clinic closed  No action in chart, no future OV scheduled    TC - please contact patient, set up establish care appointment.  Appears patient may live in [place].  Possibly [employer], [employer] or [email or web address] may work for him.    30 days pended  SIG/pharm note given    Cheri Valderrama, RT (R)

## 2021-07-20 RX ORDER — SERTRALINE HYDROCHLORIDE 100 MG/1
100 TABLET, FILM COATED ORAL DAILY
Qty: 30 TABLET | Refills: 0 | OUTPATIENT
Start: 2021-07-20

## 2021-07-20 RX ORDER — BUPROPION HYDROCHLORIDE 150 MG/1
150 TABLET ORAL EVERY MORNING
Qty: 30 TABLET | Refills: 0 | OUTPATIENT
Start: 2021-07-20

## 2021-07-22 RX ORDER — BUPROPION HYDROCHLORIDE 150 MG/1
150 TABLET ORAL EVERY MORNING
Qty: 90 TABLET | Refills: 1 | Status: SHIPPED | OUTPATIENT
Start: 2021-07-22 | End: 2022-03-28

## 2021-07-22 RX ORDER — SERTRALINE HYDROCHLORIDE 100 MG/1
100 TABLET, FILM COATED ORAL DAILY
Qty: 90 TABLET | Refills: 1 | Status: SHIPPED | OUTPATIENT
Start: 2021-07-22 | End: 2022-10-25

## 2021-07-22 NOTE — TELEPHONE ENCOUNTER
Routing refill request to provider for review/approval because:  Patient needs to be seen because:  Needs to establish care with new PCP. [facility]    Last office vist: 1/22/21 with Mary Velez    Pended 90 day supply & 1 refills. Please Review.    Next office visit: 8/17/2021 with Dr. Fuentes    Routing to [facility] Dr. Kaur.       Roma Valladares RN  [facility]

## 2021-08-17 ENCOUNTER — OFFICE VISIT (OUTPATIENT)
Dept: FAMILY MEDICINE | Facility: CLINIC | Age: 26
End: 2021-08-17
Payer: COMMERCIAL

## 2021-08-17 VITALS
TEMPERATURE: 97.3 F | WEIGHT: 260 LBS | HEART RATE: 70 BPM | OXYGEN SATURATION: 98 % | BODY MASS INDEX: 34.46 KG/M2 | DIASTOLIC BLOOD PRESSURE: 83 MMHG | SYSTOLIC BLOOD PRESSURE: 134 MMHG | HEIGHT: 73 IN | RESPIRATION RATE: 16 BRPM

## 2021-08-17 DIAGNOSIS — E78.2 MIXED HYPERLIPIDEMIA: ICD-10-CM

## 2021-08-17 DIAGNOSIS — Z00.00 ROUTINE HISTORY AND PHYSICAL EXAMINATION OF ADULT: Primary | ICD-10-CM

## 2021-08-17 DIAGNOSIS — R79.89 ELEVATED LFTS: ICD-10-CM

## 2021-08-17 DIAGNOSIS — F33.1 MODERATE EPISODE OF RECURRENT MAJOR DEPRESSIVE DISORDER (H): ICD-10-CM

## 2021-08-17 DIAGNOSIS — K58.8 OTHER IRRITABLE BOWEL SYNDROME: ICD-10-CM

## 2021-08-17 DIAGNOSIS — Z13.1 SCREENING FOR DIABETES MELLITUS: ICD-10-CM

## 2021-08-17 DIAGNOSIS — L65.9 HAIR LOSS: ICD-10-CM

## 2021-08-17 DIAGNOSIS — E78.5 DYSLIPIDEMIA: ICD-10-CM

## 2021-08-17 LAB
ERYTHROCYTE [DISTWIDTH] IN BLOOD BY AUTOMATED COUNT: 12.6 % (ref 10–15)
HBA1C MFR BLD: 5.2 % (ref 0–5.6)
HCT VFR BLD AUTO: 47.1 % (ref 40–53)
HGB BLD-MCNC: 16.8 G/DL (ref 13.3–17.7)
MCH RBC QN AUTO: 30.4 PG (ref 26.5–33)
MCHC RBC AUTO-ENTMCNC: 35.7 G/DL (ref 31.5–36.5)
MCV RBC AUTO: 85 FL (ref 78–100)
PLATELET # BLD AUTO: 258 10E3/UL (ref 150–450)
RBC # BLD AUTO: 5.52 10E6/UL (ref 4.4–5.9)
WBC # BLD AUTO: 9.3 10E3/UL (ref 4–11)

## 2021-08-17 PROCEDURE — 36415 COLL VENOUS BLD VENIPUNCTURE: CPT | Performed by: INTERNAL MEDICINE

## 2021-08-17 PROCEDURE — 83036 HEMOGLOBIN GLYCOSYLATED A1C: CPT | Performed by: INTERNAL MEDICINE

## 2021-08-17 PROCEDURE — 80061 LIPID PANEL: CPT | Performed by: INTERNAL MEDICINE

## 2021-08-17 PROCEDURE — 99395 PREV VISIT EST AGE 18-39: CPT | Performed by: INTERNAL MEDICINE

## 2021-08-17 PROCEDURE — 85027 COMPLETE CBC AUTOMATED: CPT | Performed by: INTERNAL MEDICINE

## 2021-08-17 PROCEDURE — 99214 OFFICE O/P EST MOD 30 MIN: CPT | Mod: 25 | Performed by: INTERNAL MEDICINE

## 2021-08-17 PROCEDURE — 84443 ASSAY THYROID STIM HORMONE: CPT | Performed by: INTERNAL MEDICINE

## 2021-08-17 PROCEDURE — 80053 COMPREHEN METABOLIC PANEL: CPT | Performed by: INTERNAL MEDICINE

## 2021-08-17 ASSESSMENT — PATIENT HEALTH QUESTIONNAIRE - PHQ9: SUM OF ALL RESPONSES TO PHQ QUESTIONS 1-9: 4

## 2021-08-17 ASSESSMENT — ENCOUNTER SYMPTOMS
FEVER: 0
HEADACHES: 0
CONSTIPATION: 0
FREQUENCY: 0
HEARTBURN: 0
PARESTHESIAS: 0
HEMATOCHEZIA: 0
WEAKNESS: 0
SORE THROAT: 0
NERVOUS/ANXIOUS: 1
ARTHRALGIAS: 0
PALPITATIONS: 0
NAUSEA: 0
JOINT SWELLING: 0
MYALGIAS: 0
HEMATURIA: 0
DIARRHEA: 0
CHILLS: 0
ABDOMINAL PAIN: 0
EYE PAIN: 0
DIZZINESS: 0
DYSURIA: 0
SHORTNESS OF BREATH: 0
COUGH: 0

## 2021-08-17 ASSESSMENT — MIFFLIN-ST. JEOR: SCORE: 2218.23

## 2021-08-17 NOTE — PROGRESS NOTES
"SUBJECTIVE:   CC: Shubham Castaneda is an 26 year old male who presents for preventative health visit.       Patient has been advised of split billing requirements and indicates understanding: Yes  Healthy Habits:     Getting at least 3 servings of Calcium per day:  Yes    Bi-annual eye exam:  Yes    Dental care twice a year:  Yes    Sleep apnea or symptoms of sleep apnea:  None    Diet:  Regular (no restrictions)    Frequency of exercise:  4-5 days/week    Duration of exercise:  Greater than 60 minutes    Taking medications regularly:  Yes    Medication side effects:  Significant flushing    PHQ-2 Total Score: 1    Additional concerns today:  Yes        Stopped taking the medications (wellbutryn/zoloft) 3 months ago, does not feel are necessarily    Feels his symptoms are More seasonal affective disorder. Feels when sun DISAPPEARS AND GETS COLD, LACK OF MOTIVATION, MOOD SWINGS, THINKS VERY FAST, GETS MORE IRRITABLE, not more anxiety. He will call to get refills.    Alcohol on occasion..    Sleep is good , 8 hrs a night .    Wakes up refreshed, never was told he has loud snoring    Stopped taking his medications, did not think he is needing them,     Trouble with \"ejaculation\", was anxiety; Reports \"No sexual problem\" after he stopped medications    lost 30- 35 lbs over last 1.5 yrs by wt training, diet improved    Has IBS, loose stools, multiple times, gotten better with change of diet    LOW FODMAP Diet      Today's PHQ-2 Score:   PHQ-2 ( 1999 Pfizer) 8/17/2021   Q1: Little interest or pleasure in doing things 0   Q2: Feeling down, depressed or hopeless 1   PHQ-2 Score 1   Q1: Little interest or pleasure in doing things Not at all   Q2: Feeling down, depressed or hopeless Several days   PHQ-2 Score 1       Abuse: Current or Past(Physical, Sexual or Emotional)- No  Do you feel safe in your environment? Yes    Have you ever done Advance Care Planning? (For example, a Health Directive, POLST, or a discussion with a " medical provider or your loved ones about your wishes): No, advance care planning information given to patient to review.  Patient declined advance care planning discussion at this time.    Social History     Tobacco Use     Smoking status: Never Smoker     Smokeless tobacco: Never Used   Substance Use Topics     Alcohol use: Yes     Comment: rare; once a week     If you drink alcohol do you typically have >3 drinks per day or >7 drinks per week? No    Alcohol Use 8/17/2021   Prescreen: >3 drinks/day or >7 drinks/week? No   Prescreen: >3 drinks/day or >7 drinks/week? -       Last PSA: No results found for: PSA    Reviewed orders with patient. Reviewed health maintenance and updated orders accordingly - Yes  Lab work is in process  Labs reviewed in EPIC  BP Readings from Last 3 Encounters:   08/17/21 134/83   01/22/21 138/77   07/25/19 130/72    Wt Readings from Last 3 Encounters:   08/17/21 117.9 kg (260 lb)   01/22/21 120.7 kg (266 lb)   07/25/19 130.6 kg (288 lb)                  Patient Active Problem List   Diagnosis     Night sweats     Class 2 obesity due to excess calories without serious comorbidity with body mass index (BMI) of 35.0 to 35.9 in adult     Moderate episode of recurrent major depressive disorder (H)     Other irritable bowel syndrome     Past Surgical History:   Procedure Laterality Date     ENT SURGERY      wisdom teeth     LE FORT ONE , OSTEOTOMY SAGITTAL SPLIT, COMBINED  4/17/2014    Procedure: LE FORT ONE, OSTEOTOMY SAGITTAL SPLIT ;  Surgeon: Channing Ravi DDS;  Location:  OR       Social History     Tobacco Use     Smoking status: Never Smoker     Smokeless tobacco: Never Used   Substance Use Topics     Alcohol use: Yes     Comment: rare; once a week     Family History   Problem Relation Age of Onset     Thyroid Disease Mother          Current Outpatient Medications   Medication Sig Dispense Refill     finasteride (PROPECIA) 1 MG tablet Take 1 tablet (1 mg) by mouth daily 90 tablet 0      buPROPion (WELLBUTRIN XL) 150 MG 24 hr tablet Take 1 tablet (150 mg) by mouth every morning (Patient not taking: Reported on 8/17/2021) 90 tablet 1     sertraline (ZOLOFT) 100 MG tablet Take 1 tablet (100 mg) by mouth daily (Patient not taking: Reported on 8/17/2021) 90 tablet 1     Allergies   Allergen Reactions     Gluten Meal      Recent Labs   Lab Test 08/17/21  1842 09/20/18  1427 11/22/17  1101   A1C 5.2  --   --    * 121* 112*   HDL 36* 26* 30*   TRIG 311* 340* 301*   ALT 45 88* 89*   CR 0.95 0.99 0.90   GFRESTIMATED >90 >90 >90   GFRESTBLACK  --  >90 >90   POTASSIUM 4.0 3.9 4.2   TSH 2.19 1.54  --         Reviewed and updated as needed this visit by clinical staff  Tobacco  Allergies  Meds   Med Hx  Surg Hx  Fam Hx  Soc Hx        Reviewed and updated as needed this visit by Provider   Allergies  Meds             Past Medical History:   Diagnosis Date     Hives     idiopathic     NO ACTIVE PROBLEMS      Other jaw asymmetry 4/17/2014      Past Surgical History:   Procedure Laterality Date     ENT SURGERY      wisdom teeth     LE FORT ONE , OSTEOTOMY SAGITTAL SPLIT, COMBINED  4/17/2014    Procedure: LE FORT ONE, OSTEOTOMY SAGITTAL SPLIT ;  Surgeon: Channing Ravi DDS;  Location:  OR       Review of Systems  CONSTITUTIONAL: NEGATIVE for fever, chills, change in weight  INTEGUMENTARY/SKIN: NEGATIVE for worrisome rashes, moles or lesions  EYES: NEGATIVE for vision changes or irritation  ENT: NEGATIVE for ear, mouth and throat problems  RESP: NEGATIVE for significant cough or SOB  CV: NEGATIVE for chest pain, palpitations or peripheral edema  GI: NEGATIVE for nausea, abdominal pain, heartburn, or change in bowel habits   male: negative for dysuria, hematuria, decreased urinary stream, erectile dysfunction, urethral discharge  MUSCULOSKELETAL: NEGATIVE for significant arthralgias or myalgia  NEURO: NEGATIVE for weakness, dizziness or paresthesias  ENDOCRINE: NEGATIVE for temperature  "intolerance, skin/hair changes  HEME/ALLERGY/IMMUNE: NEGATIVE for bleeding problems  PSYCHIATRIC: NEGATIVE for changes in mood or affect    OBJECTIVE:   /83 (BP Location: Right arm, Patient Position: Chair, Cuff Size: Adult Large)   Pulse 70   Temp 97.3  F (36.3  C) (Temporal)   Resp 16   Ht 1.854 m (6' 1\")   Wt 117.9 kg (260 lb)   SpO2 98%   BMI 34.30 kg/m      Physical Exam  GENERAL: healthy, alert and no distress  EYES: Eyes grossly normal to inspection, PERRL and conjunctivae and sclerae normal  HENT: ear canals and TM's normal, nose and mouth without ulcers or lesions  NECK: no adenopathy, no asymmetry, masses, or scars and thyroid normal to palpation  RESP: lungs clear to auscultation - no rales, rhonchi or wheezes  CV: regular rate and rhythm, normal S1 S2, no S3 or S4, no murmur, click or rub, no peripheral edema and peripheral pulses strong  ABDOMEN: soft, nontender, no hepatosplenomegaly, no masses and bowel sounds normal   (male): normal male genitalia without lesions or urethral discharge, no hernia  MS: no gross musculoskeletal defects noted, no edema  SKIN: no suspicious lesions or rashes  NEURO: Normal strength and tone, mentation intact and speech normal  PSYCH: mentation appears normal, affect normal/bright    Diagnostic Test Results:  Labs reviewed in Epic    ASSESSMENT/PLAN:   Diagnoses and all orders for this visit:    Routine history and physical examination of adult    Moderate episode of recurrent major depressive disorder (H)  Comments:  may need to resume SNRI dueing winter time, thinks is SAD  Orders:  -     TSH with free T4 reflex    Other irritable bowel syndrome  Comments:  Counseled on diet, LOW FODPMAP diet, may need to see dietician through GI if worsening symptoms,   Orders:  -     TSH with free T4 reflex  -     CBC with platelets  -     Comprehensive metabolic panel (BMP + Alb, Alk Phos, ALT, AST, Total. Bili, TP)  -     Adult Gastro Ref - Consult Only; " "Future    Dyslipidemia  Comments:  continue with wt loss, and life style chnages, BMI 34.3  Orders:  -     Lipid panel reflex to direct LDL Fasting    Elevated LFTs  Comments:  possible steatohepatitis or steatosis, monitor LFT, life style changes and weight loss  Orders:  -     Comprehensive metabolic panel (BMP + Alb, Alk Phos, ALT, AST, Total. Bili, TP)    Screening for diabetes mellitus  -     Hemoglobin A1c    Hair loss  Comments:  disffuse hair loss, see Derm , he ises topical minoxidil,   Orders:  -     Adult Dermatology Referral; Future  -     finasteride (PROPECIA) 1 MG tablet; Take 1 tablet (1 mg) by mouth daily        Patient has been advised of split billing requirements and indicates understanding: Yes  COUNSELING:   Reviewed preventive health counseling, as reflected in patient instructions       Regular exercise       Healthy diet/nutrition       Vision screening       Hearing screening       Alcohol Use        Family planning       Safe sex practices/STD prevention       Consider Hep C screening for all patients one time for ages 18-79 years       HIV screeninx in teen years, 1x in adult years, and at intervals if high risk       Advance Care Planning    Estimated body mass index is 34.3 kg/m  as calculated from the following:    Height as of this encounter: 1.854 m (6' 1\").    Weight as of this encounter: 117.9 kg (260 lb).     Weight management plan: Discussed healthy diet and exercise guidelines    He reports that he has never smoked. He has never used smokeless tobacco.      Counseling Resources:  ATP IV Guidelines  Pooled Cohorts Equation Calculator  FRAX Risk Assessment  ICSI Preventive Guidelines  Dietary Guidelines for Americans,   USDA's MyPlate  ASA Prophylaxis  Lung CA Screening    Priyanka Fuentes MD  [facility]  "

## 2021-08-18 LAB
ALBUMIN SERPL-MCNC: 4.4 G/DL (ref 3.4–5)
ALP SERPL-CCNC: 76 U/L (ref 40–150)
ALT SERPL W P-5'-P-CCNC: 45 U/L (ref 0–70)
ANION GAP SERPL CALCULATED.3IONS-SCNC: 5 MMOL/L (ref 3–14)
AST SERPL W P-5'-P-CCNC: 21 U/L (ref 0–45)
BILIRUB SERPL-MCNC: 0.4 MG/DL (ref 0.2–1.3)
BUN SERPL-MCNC: 19 MG/DL (ref 7–30)
CALCIUM SERPL-MCNC: 9.5 MG/DL (ref 8.5–10.1)
CHLORIDE BLD-SCNC: 104 MMOL/L (ref 94–109)
CHOLEST SERPL-MCNC: 224 MG/DL
CO2 SERPL-SCNC: 27 MMOL/L (ref 20–32)
CREAT SERPL-MCNC: 0.95 MG/DL (ref 0.66–1.25)
FASTING STATUS PATIENT QL REPORTED: NO
GFR SERPL CREATININE-BSD FRML MDRD: >90 ML/MIN/1.73M2
GLUCOSE BLD-MCNC: 82 MG/DL (ref 70–99)
HDLC SERPL-MCNC: 36 MG/DL
LDLC SERPL CALC-MCNC: 126 MG/DL
NONHDLC SERPL-MCNC: 188 MG/DL
POTASSIUM BLD-SCNC: 4 MMOL/L (ref 3.4–5.3)
PROT SERPL-MCNC: 7.9 G/DL (ref 6.8–8.8)
SODIUM SERPL-SCNC: 136 MMOL/L (ref 133–144)
TRIGL SERPL-MCNC: 311 MG/DL
TSH SERPL DL<=0.005 MIU/L-ACNC: 2.19 MU/L (ref 0.4–4)

## 2021-08-22 ENCOUNTER — TELEPHONE (OUTPATIENT)
Dept: FAMILY MEDICINE | Facility: CLINIC | Age: 26
End: 2021-08-22

## 2021-08-22 RX ORDER — FINASTERIDE 1 MG/1
1 TABLET, FILM COATED ORAL DAILY
Qty: 90 TABLET | Refills: 0 | Status: SHIPPED | OUTPATIENT
Start: 2021-08-22 | End: 2021-11-19

## 2021-08-22 RX ORDER — FENOFIBRATE 48 MG/1
48 TABLET, COATED ORAL DAILY
Qty: 90 TABLET | Refills: 0 | Status: SHIPPED | OUTPATIENT
Start: 2021-08-22 | End: 2021-12-17

## 2021-08-23 NOTE — TELEPHONE ENCOUNTER
Please notify patient I had sent him a prescription for Propecia (finasteride) 1 mg daily dose, for hair loss, till he sees dermatology for same concern.

## 2021-08-24 NOTE — TELEPHONE ENCOUNTER
Called patient and told him RX was ready until he could see Derm otology.     Mariama Mann RN  [facility]

## 2021-10-24 ENCOUNTER — HEALTH MAINTENANCE LETTER (OUTPATIENT)
Age: 26
End: 2021-10-24

## 2021-11-18 DIAGNOSIS — L65.9 HAIR LOSS: ICD-10-CM

## 2021-11-19 NOTE — TELEPHONE ENCOUNTER
LOV 8- [facility] for routine physical, no follow up given    No future OV scheduled    Cheri Valderrama RT (R)

## 2021-11-20 RX ORDER — FINASTERIDE 1 MG/1
1 TABLET, FILM COATED ORAL DAILY
Qty: 90 TABLET | Refills: 1 | Status: SHIPPED | OUTPATIENT
Start: 2021-11-20 | End: 2024-07-22

## 2021-12-17 ENCOUNTER — MYC REFILL (OUTPATIENT)
Dept: FAMILY MEDICINE | Facility: CLINIC | Age: 26
End: 2021-12-17
Payer: COMMERCIAL

## 2021-12-17 DIAGNOSIS — E78.2 MIXED HYPERLIPIDEMIA: ICD-10-CM

## 2021-12-17 DIAGNOSIS — E78.5 DYSLIPIDEMIA: ICD-10-CM

## 2021-12-20 RX ORDER — FENOFIBRATE 48 MG/1
48 TABLET, COATED ORAL DAILY
Qty: 90 TABLET | Refills: 1 | Status: SHIPPED | OUTPATIENT
Start: 2021-12-20 | End: 2022-08-02

## 2022-07-31 ENCOUNTER — MYC MEDICAL ADVICE (OUTPATIENT)
Dept: FAMILY MEDICINE | Facility: CLINIC | Age: 27
End: 2022-07-31

## 2022-07-31 DIAGNOSIS — E78.2 MIXED HYPERLIPIDEMIA: ICD-10-CM

## 2022-07-31 DIAGNOSIS — E78.5 DYSLIPIDEMIA: ICD-10-CM

## 2022-08-02 RX ORDER — FENOFIBRATE 48 MG/1
48 TABLET, COATED ORAL DAILY
Qty: 90 TABLET | Refills: 0 | Status: SHIPPED | OUTPATIENT
Start: 2022-08-02 | End: 2024-08-20

## 2022-08-02 NOTE — TELEPHONE ENCOUNTER
Pt was advised in separate onefinestayhart message to make a fasting exam appointment     Routing refill request to provider for review/approval because:  Patient needs to be seen because:  Has not returned for fasting labs since starting medication. Due this month for fasting annual exam     Sammi LAGOS, Triage RN  [facility]

## 2022-08-31 NOTE — TELEPHONE ENCOUNTER
Completed form faxed to [employer].  [facility] accordion file-Blue pod

## 2022-10-15 ENCOUNTER — HEALTH MAINTENANCE LETTER (OUTPATIENT)
Age: 27
End: 2022-10-15

## 2022-10-24 NOTE — PROGRESS NOTES
SUBJECTIVE:   CC: Shubham is an 27 year old who presents for preventative health visit.   Pt is new to me, established in clinic      Healthy Habits:     Getting at least 3 servings of Calcium per day:  Yes    Bi-annual eye exam:  Yes    Dental care twice a year:  NO    Sleep apnea or symptoms of sleep apnea:  None    Diet:  Breakfast skipped    Frequency of exercise:  4-5 days/week    Duration of exercise:  Greater than 60 minutes    Taking medications regularly:  Yes    Medication side effects:  Not applicable    PHQ-2 Total Score: 1    Additional concerns today:  No          Today's PHQ-2 Score:   PHQ-2 ( 1999 Pfizer) 10/25/2022   Q1: Little interest or pleasure in doing things 0   Q2: Feeling down, depressed or hopeless 1   PHQ-2 Score 1   PHQ-2 Total Score (12-17 Years)- Positive if 3 or more points; Administer PHQ-A if positive -   Q1: Little interest or pleasure in doing things Not at all   Q2: Feeling down, depressed or hopeless Several days   PHQ-2 Score 1       Abuse: Current or Past(Physical, Sexual or Emotional)- No  Do you feel safe in your environment? Yes        Social History     Tobacco Use     Smoking status: Never     Smokeless tobacco: Never   Substance Use Topics     Alcohol use: Yes     Comment: rare; once a week         Alcohol Use 10/25/2022   Prescreen: >3 drinks/day or >7 drinks/week? No   Prescreen: >3 drinks/day or >7 drinks/week? -   No flowsheet data found.    Last PSA: No results found for: PSA    Reviewed orders with patient. Reviewed health maintenance and updated orders accordingly - Yes  Lab work is in process    Reviewed and updated as needed this visit by clinical staff   Tobacco  Allergies  Meds              Reviewed and updated as needed this visit by Provider                     Review of Systems  CONSTITUTIONAL: NEGATIVE for fever, chills, change in weight  INTEGUMENTARY/SKIN: NEGATIVE for worrisome rashes, moles or lesions  EYES: NEGATIVE for vision changes or  "irritation  ENT: NEGATIVE for ear, mouth and throat problems  RESP: NEGATIVE for significant cough or SOB  CV: NEGATIVE for chest pain, palpitations or peripheral edema  GI: NEGATIVE for nausea, abdominal pain, heartburn, or change in bowel habits   male: negative for dysuria, hematuria, decreased urinary stream, erectile dysfunction, urethral discharge  MUSCULOSKELETAL: NEGATIVE for significant arthralgias or myalgia  NEURO: NEGATIVE for weakness, dizziness or paresthesias  PSYCHIATRIC: NEGATIVE for changes in mood or affect    OBJECTIVE:   BP (!) 155/82 (BP Location: Right arm, Patient Position: Sitting, Cuff Size: Adult Large)   Pulse 63   Temp 97.7  F (36.5  C) (Temporal)   Resp 16   Ht 1.854 m (6' 1\")   Wt 122.5 kg (270 lb)   SpO2 97%   BMI 35.62 kg/m      Physical Exam  GENERAL: healthy, alert and no distress  EYES: Eyes grossly normal to inspection, PERRL and conjunctivae and sclerae normal  HENT: ear canals and TM's normal, nose and mouth without ulcers or lesions  NECK: no adenopathy, no asymmetry, masses, or scars and thyroid normal to palpation  RESP: lungs clear to auscultation - no rales, rhonchi or wheezes  CV: regular rate and rhythm, normal S1 S2, no S3 or S4, no murmur, click or rub, no peripheral edema and peripheral pulses strong  ABDOMEN: soft, nontender, no hepatosplenomegaly, no masses and bowel sounds normal  MS: no gross musculoskeletal defects noted, no edema  SKIN: no suspicious lesions or rashes  NEURO: Normal strength and tone, mentation intact and speech normal  PSYCH: mentation appears normal, affect normal/bright        ASSESSMENT/PLAN:       ICD-10-CM    1. Encounter for preventive care   Age and gender appropriate preventive care and screenings are discussed.  Particular attention to personal preventive care and age appropriate lifestyle including the incorporation of healthy diet and physical activity is made.     Z00.00 CBC with Platelets & Differential     Comprehensive " "metabolic panel     TSH with free T4 reflex     Lipid panel reflex to direct LDL Fasting      2. Anxiety   Medication noted.  No changes made F41.9       3. Mixed hyperlipidemia   On Fibrate.  He would like to trial off if TG look ok.   E78.2             COUNSELING:   Reviewed preventive health counseling, as reflected in patient instructions    Estimated body mass index is 35.62 kg/m  as calculated from the following:    Height as of this encounter: 1.854 m (6' 1\").    Weight as of this encounter: 122.5 kg (270 lb).         He reports that he has never smoked. He has never used smokeless tobacco.      Andrea Mckeon MD  [facility]  "

## 2022-10-25 ENCOUNTER — OFFICE VISIT (OUTPATIENT)
Dept: FAMILY MEDICINE | Facility: CLINIC | Age: 27
End: 2022-10-25
Payer: COMMERCIAL

## 2022-10-25 VITALS
DIASTOLIC BLOOD PRESSURE: 82 MMHG | BODY MASS INDEX: 35.78 KG/M2 | RESPIRATION RATE: 16 BRPM | SYSTOLIC BLOOD PRESSURE: 155 MMHG | HEART RATE: 63 BPM | WEIGHT: 270 LBS | HEIGHT: 73 IN | OXYGEN SATURATION: 97 % | TEMPERATURE: 97.7 F

## 2022-10-25 DIAGNOSIS — Z00.00 ENCOUNTER FOR PREVENTIVE CARE: Primary | ICD-10-CM

## 2022-10-25 DIAGNOSIS — F41.9 ANXIETY: ICD-10-CM

## 2022-10-25 DIAGNOSIS — E78.2 MIXED HYPERLIPIDEMIA: ICD-10-CM

## 2022-10-25 DIAGNOSIS — Z23 HIGH PRIORITY FOR 2019-NCOV VACCINE: ICD-10-CM

## 2022-10-25 LAB
ALBUMIN SERPL-MCNC: 4.4 G/DL (ref 3.4–5)
ALP SERPL-CCNC: 72 U/L (ref 40–150)
ALT SERPL W P-5'-P-CCNC: 63 U/L (ref 0–70)
ANION GAP SERPL CALCULATED.3IONS-SCNC: 6 MMOL/L (ref 3–14)
AST SERPL W P-5'-P-CCNC: 27 U/L (ref 0–45)
BASOPHILS # BLD AUTO: 0 10E3/UL (ref 0–0.2)
BASOPHILS NFR BLD AUTO: 1 %
BILIRUB SERPL-MCNC: 0.5 MG/DL (ref 0.2–1.3)
BUN SERPL-MCNC: 18 MG/DL (ref 7–30)
CALCIUM SERPL-MCNC: 9.3 MG/DL (ref 8.5–10.1)
CHLORIDE BLD-SCNC: 103 MMOL/L (ref 94–109)
CO2 SERPL-SCNC: 27 MMOL/L (ref 20–32)
CREAT SERPL-MCNC: 1.14 MG/DL (ref 0.66–1.25)
EOSINOPHIL # BLD AUTO: 0.2 10E3/UL (ref 0–0.7)
EOSINOPHIL NFR BLD AUTO: 3 %
ERYTHROCYTE [DISTWIDTH] IN BLOOD BY AUTOMATED COUNT: 12.1 % (ref 10–15)
GFR SERPL CREATININE-BSD FRML MDRD: >90 ML/MIN/1.73M2
GLUCOSE BLD-MCNC: 94 MG/DL (ref 70–99)
HCT VFR BLD AUTO: 45.6 % (ref 40–53)
HGB BLD-MCNC: 15.7 G/DL (ref 13.3–17.7)
IMM GRANULOCYTES # BLD: 0 10E3/UL
IMM GRANULOCYTES NFR BLD: 0 %
LYMPHOCYTES # BLD AUTO: 2.1 10E3/UL (ref 0.8–5.3)
LYMPHOCYTES NFR BLD AUTO: 26 %
MCH RBC QN AUTO: 29 PG (ref 26.5–33)
MCHC RBC AUTO-ENTMCNC: 34.4 G/DL (ref 31.5–36.5)
MCV RBC AUTO: 84 FL (ref 78–100)
MONOCYTES # BLD AUTO: 1 10E3/UL (ref 0–1.3)
MONOCYTES NFR BLD AUTO: 12 %
NEUTROPHILS # BLD AUTO: 4.6 10E3/UL (ref 1.6–8.3)
NEUTROPHILS NFR BLD AUTO: 59 %
PLATELET # BLD AUTO: 280 10E3/UL (ref 150–450)
POTASSIUM BLD-SCNC: 4.1 MMOL/L (ref 3.4–5.3)
PROT SERPL-MCNC: 7.9 G/DL (ref 6.8–8.8)
RBC # BLD AUTO: 5.41 10E6/UL (ref 4.4–5.9)
SODIUM SERPL-SCNC: 136 MMOL/L (ref 133–144)
TSH SERPL DL<=0.005 MIU/L-ACNC: 2.24 MU/L (ref 0.4–4)
WBC # BLD AUTO: 7.9 10E3/UL (ref 4–11)

## 2022-10-25 PROCEDURE — 80061 LIPID PANEL: CPT | Performed by: INTERNAL MEDICINE

## 2022-10-25 PROCEDURE — 0124A COVID-19,PF,PFIZER BOOSTER BIVALENT: CPT | Performed by: INTERNAL MEDICINE

## 2022-10-25 PROCEDURE — 99395 PREV VISIT EST AGE 18-39: CPT | Mod: 25 | Performed by: INTERNAL MEDICINE

## 2022-10-25 PROCEDURE — 91312 COVID-19,PF,PFIZER BOOSTER BIVALENT: CPT | Performed by: INTERNAL MEDICINE

## 2022-10-25 PROCEDURE — 36415 COLL VENOUS BLD VENIPUNCTURE: CPT | Performed by: INTERNAL MEDICINE

## 2022-10-25 PROCEDURE — 80050 GENERAL HEALTH PANEL: CPT | Performed by: INTERNAL MEDICINE

## 2022-10-25 ASSESSMENT — ENCOUNTER SYMPTOMS
CONSTIPATION: 0
HEARTBURN: 0
DYSURIA: 0
ARTHRALGIAS: 0
FEVER: 0
HEMATOCHEZIA: 0
DIARRHEA: 0
PARESTHESIAS: 0
JOINT SWELLING: 0
MYALGIAS: 0
PALPITATIONS: 0
CHILLS: 0
FREQUENCY: 0
HEADACHES: 0
NAUSEA: 0
NERVOUS/ANXIOUS: 0
ABDOMINAL PAIN: 0
COUGH: 0
SHORTNESS OF BREATH: 0
HEMATURIA: 0
WEAKNESS: 0
EYE PAIN: 0
DIZZINESS: 0
SORE THROAT: 0

## 2022-10-25 ASSESSMENT — PATIENT HEALTH QUESTIONNAIRE - PHQ9
10. IF YOU CHECKED OFF ANY PROBLEMS, HOW DIFFICULT HAVE THESE PROBLEMS MADE IT FOR YOU TO DO YOUR WORK, TAKE CARE OF THINGS AT HOME, OR GET ALONG WITH OTHER PEOPLE: NOT DIFFICULT AT ALL
SUM OF ALL RESPONSES TO PHQ QUESTIONS 1-9: 6
SUM OF ALL RESPONSES TO PHQ QUESTIONS 1-9: 6

## 2022-10-25 ASSESSMENT — PAIN SCALES - GENERAL: PAINLEVEL: NO PAIN (0)

## 2022-10-26 LAB
CHOLEST SERPL-MCNC: 226 MG/DL
FASTING STATUS PATIENT QL REPORTED: YES
HDLC SERPL-MCNC: 37 MG/DL
LDLC SERPL CALC-MCNC: 143 MG/DL
NONHDLC SERPL-MCNC: 189 MG/DL
TRIGL SERPL-MCNC: 230 MG/DL

## 2022-11-21 ENCOUNTER — MYC REFILL (OUTPATIENT)
Dept: FAMILY MEDICINE | Facility: CLINIC | Age: 27
End: 2022-11-21

## 2022-11-21 DIAGNOSIS — F41.9 ANXIETY: ICD-10-CM

## 2022-11-23 RX ORDER — BUPROPION HYDROCHLORIDE 150 MG/1
150 TABLET ORAL EVERY MORNING
Qty: 90 TABLET | Refills: 3 | Status: SHIPPED | OUTPATIENT
Start: 2022-11-23 | End: 2023-12-04

## 2022-11-23 NOTE — TELEPHONE ENCOUNTER
Prescription approved per [facility] Refill Protocol.  Sharon Andres, RN  [facility] RN Triage Team

## 2022-12-15 NOTE — MR AVS SNAPSHOT
After Visit Summary   3/19/2018    Shubham Castaneda    MRN: [id number]           Patient Information     Date Of Birth          1995        Visit Information        Provider Department      3/19/2018 9:30 AM Azra Morrell PA-C [facility]        Today's Diagnoses     Viral gastroenteritis    -  1    Viral URI with cough          Care Instructions    Diarrhea (Acute)  Diarrhea has several possible causes. Common  stomach flu  is caused by a virus and usually lasts 3-7 days. Food poisoning, bacteria or parasites are other causes for diarrhea. Only diarrhea caused by bacteria or parasites requires treatment with an antibiotic. Diarrhea from a virus or food poisoning improves with simple home treatment.  The most important thing is to stay well hydrated as you lose a lot of water through you bowel movements.  Home Care:  If symptoms are severe, rest for the next 24 hours or until you are feeling better.  You may use acetaminophen (Tylenol) or ibuprofen (Motrin, Advil) to control fever, (Aspirin should never be used in anyone under 18 years of age who is ill with a fever. It may cause severe liver damage.)  Sometimes anti-diarrhea medicine can make your condition worse if the cause is an infectious diarrhea. Therefore, anti-diarrhea medicine should not be taken for this condition unless advised by your doctor.  During The First 24-48 Hours follow the diet below:  BEVERAGES: Sport drinks like Gatorade, soft drinks without caffeine; ginger ale, mineral water (plain or flavored), decaffeinated tea and coffee.  SOUPS: Clear broth, consommé and bouillon  DESSERTS: Plain gelatin (Jell-O), popsicles and fruit juice bars.  Hot cereal, plain toast, bread, rolls, crackers  Plain noodles, rice, mashed potatoes, chicken noodle or rice soup  Unsweetened canned fruit (avoid pineapple), bananas  Limit fat intake to less than 15 grams per day by avoiding margarine, butter,  oils, mayonnaise, sauces, gravies, fried foods, peanut butter, meat, poultry and fish.  Limit fiber; avoid raw or cooked vegetables, fresh fruits (except bananas) and bran cereals.  Limit caffeine and chocolate. No spices or seasonings except salt.  During The Next 24 Hours, gradually resume a normal diet, as you feel better and your symptoms improve.  Follow Up with your doctor or as advised if you are not improving over the next two days. If you were asked to bring a specimen from home, bring the sample on the day of collection. You may call in 2 days (or as directed) for the results.  Get Prompt Medical Attention if any of the following occur:  Increasing abdominal pain or constant lower right abdominal pain  Continued vomiting (unable to keep liquids down)  Blood in vomit or stool (black or red color)  Reduced oral intake  Dark urine, reduced urine output  Weakness, dizziness, fainting- these are signs of dehydration  Drowsiness, confusion, stiff neck or seizure  Fever of 100.4 F (38 C) oral or higher, not better with fever medication  New rash    OTC Medications for Loose Stools:    Pepto Bismol 262mg  Take 2 tablets (or 30mL if liquid) as needed every 30-60 minutes to prevent loose stools.  Do not take more than 8 tablets (or 240mL) per day.   Bowel movements may look green while using, this is due to the medication.    Loperamide 2mg:   Take 2 tabs (4mg) initially, then 1 tab (2mg) after each unformed stool.  Do not take more than 8 tabs (16mg) per day.    Stop using if you experience bloating or worsening abdominal pain.  Do not use for longer than 2 days.    Bowel movements may look green while using, this is due to the medication.            Follow-ups after your visit        Who to contact     If you have questions or need follow up information about today's clinic visit or your schedule please contact [facility] directly at [phone].  Normal or non-critical lab and  imaging results will be communicated to you by 3LMhart, letter or phone within 4 business days after the clinic has received the results. If you do not hear from us within 7 days, please contact the clinic through [employer] or phone. If you have a critical or abnormal lab result, we will notify you by phone as soon as possible.  Submit refill requests through [employer] or call your pharmacy and they will forward the refill request to us. Please allow 3 business days for your refill to be completed.          Additional Information About Your Visit        [employer] Information     [employer] gives you secure access to your electronic health record. If you see a primary care provider, you can also send messages to your care team and make appointments. If you have questions, please call your primary care clinic.  If you do not have a primary care provider, please call [phone] and they will assist you.        Care EveryWhere ID     This is your Care EveryWhere ID. This could be used by other organizations to access your [facility] medical records  [id number]        Your Vitals Were     Pulse Temperature Respirations Pulse Oximetry BMI (Body Mass Index)       103 98.4  F (36.9  C) (Tympanic) 18 95% 34.92 kg/m2        Blood Pressure from Last 3 Encounters:   03/19/18 138/78   11/22/17 136/80   11/10/17 120/70    Weight from Last 3 Encounters:   03/19/18 272 lb (123.4 kg)   11/22/17 282 lb (127.9 kg)   11/10/17 282 lb (127.9 kg)              Today, you had the following     No orders found for display         Today's Medication Changes          These changes are accurate as of 3/19/18  9:52 AM.  If you have any questions, ask your nurse or doctor.               Start taking these medicines.        Dose/Directions    benzonatate 200 MG capsule   Commonly known as:  TESSALON   Used for:  Viral URI with cough   Started by:  Azra Morrell PA-C        Dose:  200 mg   Take 1 capsule (200 mg) by mouth 3 times daily as  needed for cough   Quantity:  30 capsule   Refills:  1       guaiFENesin-codeine 100-10 MG/5ML Soln solution   Commonly known as:  ROBITUSSIN AC   Used for:  Viral URI with cough   Started by:  Azra Morrell PA-C        Dose:  2 tsp.   Take 10 mLs by mouth every 4 hours as needed for cough   Quantity:  120 mL   Refills:  0       ondansetron 4 MG tablet   Commonly known as:  ZOFRAN   Used for:  Viral gastroenteritis   Started by:  Azra Morrell PA-C        Dose:  4 mg   Take 1 tablet (4 mg) by mouth every 8 hours as needed for nausea   Quantity:  30 tablet   Refills:  3            Where to get your medicines      These medications were sent to [facility] [address] [place]   [address] [facility] [address]     Phone:  [phone]     benzonatate 200 MG capsule    ondansetron 4 MG tablet         Some of these will need a paper prescription and others can be bought over the counter.  Ask your nurse if you have questions.     Bring a paper prescription for each of these medications     guaiFENesin-codeine 100-10 MG/5ML Soln solution                Primary Care Provider Office Phone # Fax #    Gunnar Li [phone] [address] [facility] [address]        Equal Access to Services     DEYA MAGALLON : Hadii aad ku hadasho Soomaali, waaxda luqadaha, qaybta kaalmada adeegyada, jessy martinez. So [facility] [phone].    ATENCIÓN: Si habla español, tiene a palumbo disposición servicios gratuitos de asistencia lingüística. Llame al [phone].    We comply with applicable federal civil rights laws and [place] laws. We do not discriminate on the basis of race, color, national origin, age, disability, sex, sexual orientation, or gender identity.            Thank you!     Thank you for choosing [facility]  for your care. Our goal is always to provide you with excellent care.  Hearing back from our patients is one way we can continue to improve our services. Please take a few minutes to complete the written survey that you may receive in the mail after your visit with us. Thank you!             Your Updated Medication List - Protect others around you: Learn how to safely use, store and throw away your medicines at www.disposemymeds.org.          This list is accurate as of 3/19/18  9:52 AM.  Always use your most recent med list.                   Brand Name Dispense Instructions for use Diagnosis    benzonatate 200 MG capsule    TESSALON    30 capsule    Take 1 capsule (200 mg) by mouth 3 times daily as needed for cough    Viral URI with cough       guaiFENesin-codeine 100-10 MG/5ML Soln solution    ROBITUSSIN AC    120 mL    Take 10 mLs by mouth every 4 hours as needed for cough    Viral URI with cough       ondansetron 4 MG tablet    ZOFRAN    30 tablet    Take 1 tablet (4 mg) by mouth every 8 hours as needed for nausea    Viral gastroenteritis          Detail Level: Detailed

## 2023-12-04 DIAGNOSIS — F41.9 ANXIETY: ICD-10-CM

## 2023-12-04 RX ORDER — BUPROPION HYDROCHLORIDE 150 MG/1
150 TABLET ORAL EVERY MORNING
Qty: 90 TABLET | Refills: 0 | Status: SHIPPED | OUTPATIENT
Start: 2023-12-04 | End: 2024-07-18

## 2024-01-07 ENCOUNTER — HEALTH MAINTENANCE LETTER (OUTPATIENT)
Age: 29
End: 2024-01-07

## 2024-07-18 ENCOUNTER — MYC REFILL (OUTPATIENT)
Dept: FAMILY MEDICINE | Facility: CLINIC | Age: 29
End: 2024-07-18
Payer: COMMERCIAL

## 2024-07-18 DIAGNOSIS — L65.9 HAIR LOSS: ICD-10-CM

## 2024-07-18 DIAGNOSIS — F41.9 ANXIETY: ICD-10-CM

## 2024-07-19 RX ORDER — BUPROPION HYDROCHLORIDE 150 MG/1
150 TABLET ORAL EVERY MORNING
Qty: 30 TABLET | Refills: 0 | Status: SHIPPED | OUTPATIENT
Start: 2024-07-19 | End: 2024-08-20

## 2024-07-22 RX ORDER — FINASTERIDE 1 MG/1
1 TABLET, FILM COATED ORAL DAILY
Qty: 30 TABLET | Refills: 0 | Status: SHIPPED | OUTPATIENT
Start: 2024-07-22 | End: 2024-08-20

## 2024-07-22 NOTE — ADDENDUM NOTE
Addended by: PRINCESS SALINAS on: 7/22/2024 10:02 AM     Modules accepted: Orders    
xray disk given/DC instructions

## 2024-07-22 NOTE — TELEPHONE ENCOUNTER
Pt requesting a refill of finasteride.     Routing to refill team for review.     Thank you,  Ida Hill RN

## 2024-08-01 ENCOUNTER — DOCUMENTATION ONLY (OUTPATIENT)
Dept: FAMILY MEDICINE | Facility: CLINIC | Age: 29
End: 2024-08-01
Payer: COMMERCIAL

## 2024-08-01 DIAGNOSIS — E78.2 MIXED HYPERLIPIDEMIA: Primary | ICD-10-CM

## 2024-08-01 DIAGNOSIS — Z13.0 SCREENING FOR DEFICIENCY ANEMIA: ICD-10-CM

## 2024-08-01 DIAGNOSIS — Z13.1 SCREENING FOR DIABETES MELLITUS: ICD-10-CM

## 2024-08-08 ENCOUNTER — LAB (OUTPATIENT)
Dept: LAB | Facility: CLINIC | Age: 29
End: 2024-08-08
Payer: COMMERCIAL

## 2024-08-08 DIAGNOSIS — Z13.0 SCREENING FOR DEFICIENCY ANEMIA: ICD-10-CM

## 2024-08-08 DIAGNOSIS — E78.2 MIXED HYPERLIPIDEMIA: ICD-10-CM

## 2024-08-08 DIAGNOSIS — Z13.1 SCREENING FOR DIABETES MELLITUS: ICD-10-CM

## 2024-08-08 LAB
ALBUMIN SERPL BCG-MCNC: 4.7 G/DL (ref 3.5–5.2)
ALP SERPL-CCNC: 79 U/L (ref 40–150)
ALT SERPL W P-5'-P-CCNC: 58 U/L (ref 0–70)
ANION GAP SERPL CALCULATED.3IONS-SCNC: 12 MMOL/L (ref 7–15)
AST SERPL W P-5'-P-CCNC: 37 U/L (ref 0–45)
BILIRUB SERPL-MCNC: 0.5 MG/DL
BUN SERPL-MCNC: 17.6 MG/DL (ref 6–20)
CALCIUM SERPL-MCNC: 9.3 MG/DL (ref 8.8–10.4)
CHLORIDE SERPL-SCNC: 105 MMOL/L (ref 98–107)
CHOLEST SERPL-MCNC: 218 MG/DL
CREAT SERPL-MCNC: 1.11 MG/DL (ref 0.67–1.17)
EGFRCR SERPLBLD CKD-EPI 2021: >90 ML/MIN/1.73M2
ERYTHROCYTE [DISTWIDTH] IN BLOOD BY AUTOMATED COUNT: 12 % (ref 10–15)
FASTING STATUS PATIENT QL REPORTED: YES
FASTING STATUS PATIENT QL REPORTED: YES
GLUCOSE SERPL-MCNC: 99 MG/DL (ref 70–99)
HCO3 SERPL-SCNC: 23 MMOL/L (ref 22–29)
HCT VFR BLD AUTO: 44 % (ref 40–53)
HDLC SERPL-MCNC: 32 MG/DL
HGB BLD-MCNC: 15.3 G/DL (ref 13.3–17.7)
LDLC SERPL CALC-MCNC: 141 MG/DL
MCH RBC QN AUTO: 28.5 PG (ref 26.5–33)
MCHC RBC AUTO-ENTMCNC: 34.8 G/DL (ref 31.5–36.5)
MCV RBC AUTO: 82 FL (ref 78–100)
NONHDLC SERPL-MCNC: 186 MG/DL
PLATELET # BLD AUTO: 269 10E3/UL (ref 150–450)
POTASSIUM SERPL-SCNC: 4.3 MMOL/L (ref 3.4–5.3)
PROT SERPL-MCNC: 7.4 G/DL (ref 6.4–8.3)
RBC # BLD AUTO: 5.37 10E6/UL (ref 4.4–5.9)
SODIUM SERPL-SCNC: 140 MMOL/L (ref 135–145)
TRIGL SERPL-MCNC: 227 MG/DL
WBC # BLD AUTO: 7 10E3/UL (ref 4–11)

## 2024-08-08 PROCEDURE — 80053 COMPREHEN METABOLIC PANEL: CPT

## 2024-08-08 PROCEDURE — 36415 COLL VENOUS BLD VENIPUNCTURE: CPT

## 2024-08-08 PROCEDURE — 80061 LIPID PANEL: CPT

## 2024-08-08 PROCEDURE — 85027 COMPLETE CBC AUTOMATED: CPT

## 2024-08-19 SDOH — HEALTH STABILITY: PHYSICAL HEALTH: ON AVERAGE, HOW MANY MINUTES DO YOU ENGAGE IN EXERCISE AT THIS LEVEL?: 60 MIN

## 2024-08-19 SDOH — HEALTH STABILITY: PHYSICAL HEALTH: ON AVERAGE, HOW MANY DAYS PER WEEK DO YOU ENGAGE IN MODERATE TO STRENUOUS EXERCISE (LIKE A BRISK WALK)?: 5 DAYS

## 2024-08-19 ASSESSMENT — SOCIAL DETERMINANTS OF HEALTH (SDOH): HOW OFTEN DO YOU GET TOGETHER WITH FRIENDS OR RELATIVES?: THREE TIMES A WEEK

## 2024-08-19 ASSESSMENT — PATIENT HEALTH QUESTIONNAIRE - PHQ9
10. IF YOU CHECKED OFF ANY PROBLEMS, HOW DIFFICULT HAVE THESE PROBLEMS MADE IT FOR YOU TO DO YOUR WORK, TAKE CARE OF THINGS AT HOME, OR GET ALONG WITH OTHER PEOPLE: SOMEWHAT DIFFICULT
SUM OF ALL RESPONSES TO PHQ QUESTIONS 1-9: 6
SUM OF ALL RESPONSES TO PHQ QUESTIONS 1-9: 6

## 2024-08-20 ENCOUNTER — OFFICE VISIT (OUTPATIENT)
Dept: FAMILY MEDICINE | Facility: CLINIC | Age: 29
End: 2024-08-20
Payer: COMMERCIAL

## 2024-08-20 VITALS
HEART RATE: 72 BPM | DIASTOLIC BLOOD PRESSURE: 84 MMHG | RESPIRATION RATE: 18 BRPM | TEMPERATURE: 97.5 F | OXYGEN SATURATION: 98 % | SYSTOLIC BLOOD PRESSURE: 133 MMHG | BODY MASS INDEX: 37.31 KG/M2 | HEIGHT: 74 IN | WEIGHT: 290.7 LBS

## 2024-08-20 DIAGNOSIS — E66.812 CLASS 2 OBESITY DUE TO EXCESS CALORIES WITHOUT SERIOUS COMORBIDITY WITH BODY MASS INDEX (BMI) OF 37.0 TO 37.9 IN ADULT: ICD-10-CM

## 2024-08-20 DIAGNOSIS — E66.09 CLASS 2 OBESITY DUE TO EXCESS CALORIES WITHOUT SERIOUS COMORBIDITY WITH BODY MASS INDEX (BMI) OF 37.0 TO 37.9 IN ADULT: ICD-10-CM

## 2024-08-20 DIAGNOSIS — L65.9 HAIR LOSS: ICD-10-CM

## 2024-08-20 DIAGNOSIS — Z00.00 WELL ADULT EXAM: Primary | ICD-10-CM

## 2024-08-20 DIAGNOSIS — Z13.1 SCREENING FOR DIABETES MELLITUS: ICD-10-CM

## 2024-08-20 DIAGNOSIS — F41.9 ANXIETY: ICD-10-CM

## 2024-08-20 DIAGNOSIS — Z11.59 NEED FOR HEPATITIS C SCREENING TEST: ICD-10-CM

## 2024-08-20 DIAGNOSIS — E78.2 MIXED HYPERLIPIDEMIA: ICD-10-CM

## 2024-08-20 DIAGNOSIS — F33.1 MODERATE EPISODE OF RECURRENT MAJOR DEPRESSIVE DISORDER (H): ICD-10-CM

## 2024-08-20 LAB
HBA1C MFR BLD: 5.3 % (ref 0–5.6)
HCV AB SERPL QL IA: NONREACTIVE
TSH SERPL DL<=0.005 MIU/L-ACNC: 1.93 UIU/ML (ref 0.3–4.2)

## 2024-08-20 PROCEDURE — 90472 IMMUNIZATION ADMIN EACH ADD: CPT | Performed by: FAMILY MEDICINE

## 2024-08-20 PROCEDURE — 36415 COLL VENOUS BLD VENIPUNCTURE: CPT | Performed by: FAMILY MEDICINE

## 2024-08-20 PROCEDURE — 99395 PREV VISIT EST AGE 18-39: CPT | Mod: 25 | Performed by: FAMILY MEDICINE

## 2024-08-20 PROCEDURE — 90746 HEPB VACCINE 3 DOSE ADULT IM: CPT | Performed by: FAMILY MEDICINE

## 2024-08-20 PROCEDURE — 90651 9VHPV VACCINE 2/3 DOSE IM: CPT | Performed by: FAMILY MEDICINE

## 2024-08-20 PROCEDURE — 83036 HEMOGLOBIN GLYCOSYLATED A1C: CPT | Performed by: FAMILY MEDICINE

## 2024-08-20 PROCEDURE — 84443 ASSAY THYROID STIM HORMONE: CPT | Performed by: FAMILY MEDICINE

## 2024-08-20 PROCEDURE — 90713 POLIOVIRUS IPV SC/IM: CPT | Performed by: FAMILY MEDICINE

## 2024-08-20 PROCEDURE — 90471 IMMUNIZATION ADMIN: CPT | Performed by: FAMILY MEDICINE

## 2024-08-20 PROCEDURE — 86803 HEPATITIS C AB TEST: CPT | Performed by: FAMILY MEDICINE

## 2024-08-20 RX ORDER — BUPROPION HYDROCHLORIDE 150 MG/1
150 TABLET ORAL EVERY MORNING
Qty: 90 TABLET | Refills: 3 | Status: SHIPPED | OUTPATIENT
Start: 2024-08-20

## 2024-08-20 RX ORDER — FINASTERIDE 1 MG/1
1 TABLET, FILM COATED ORAL DAILY
Qty: 90 TABLET | Refills: 3 | Status: SHIPPED | OUTPATIENT
Start: 2024-08-20

## 2024-08-20 ASSESSMENT — ANXIETY QUESTIONNAIRES
4. TROUBLE RELAXING: NOT AT ALL
IF YOU CHECKED OFF ANY PROBLEMS ON THIS QUESTIONNAIRE, HOW DIFFICULT HAVE THESE PROBLEMS MADE IT FOR YOU TO DO YOUR WORK, TAKE CARE OF THINGS AT HOME, OR GET ALONG WITH OTHER PEOPLE: SOMEWHAT DIFFICULT
8. IF YOU CHECKED OFF ANY PROBLEMS, HOW DIFFICULT HAVE THESE MADE IT FOR YOU TO DO YOUR WORK, TAKE CARE OF THINGS AT HOME, OR GET ALONG WITH OTHER PEOPLE?: SOMEWHAT DIFFICULT
6. BECOMING EASILY ANNOYED OR IRRITABLE: SEVERAL DAYS
2. NOT BEING ABLE TO STOP OR CONTROL WORRYING: NOT AT ALL
5. BEING SO RESTLESS THAT IT IS HARD TO SIT STILL: SEVERAL DAYS
3. WORRYING TOO MUCH ABOUT DIFFERENT THINGS: NOT AT ALL
1. FEELING NERVOUS, ANXIOUS, OR ON EDGE: SEVERAL DAYS
GAD7 TOTAL SCORE: 3
GAD7 TOTAL SCORE: 3
7. FEELING AFRAID AS IF SOMETHING AWFUL MIGHT HAPPEN: NOT AT ALL
7. FEELING AFRAID AS IF SOMETHING AWFUL MIGHT HAPPEN: NOT AT ALL

## 2024-08-20 ASSESSMENT — PAIN SCALES - GENERAL: PAINLEVEL: NO PAIN (0)

## 2024-08-20 NOTE — PROGRESS NOTES
Preventive Care Visit  [facility]  Mark Cintron DO, Family Medicine  Aug 20, 2024      Assessment & Plan     Well adult exam  I encouraged him to keep getting regular exercise and eat a healthy diet.  We are going to check labs as listed below.  He already had a recent CBC, CMP and cholesterol panel checked.    Moderate episode of recurrent major depressive disorder (H)  Stable on Wellbutrin.  Refills were given.    Anxiety  Stable on Wellbutrin.  - buPROPion (WELLBUTRIN XL) 150 MG 24 hr tablet; Take 1 tablet (150 mg) by mouth every morning Office visit required for further refills    Mixed hyperlipidemia  He will continue to work on lifestyle modifications helping the cholesterol down.    Class 2 obesity due to excess calories without serious comorbidity with body mass index (BMI) of 37.0 to 37.9 in adult  We discussed dietary and lifestyle modifications to help with weight loss.  He was given a referral to see a nutrition specialist.  We also discussed the possibility of starting medication such as a GLP-1 in the future if needed.  - Adult Nutrition  Referral; Future    Hair loss  He was given a refill finasteride which continues to help with hair loss.  We are also checking thyroid panel as part of the labs today there is a family history of thyroid disease in his mother.  - finasteride (PROPECIA) 1 MG tablet; Take 1 tablet (1 mg) by mouth daily Office visit is required for further refills  - TSH with free T4 reflex; Future  - TSH with free T4 reflex    Need for hepatitis C screening test  - Hepatitis C Screen Reflex to HCV RNA Quant and Genotype; Future  - Hepatitis C Screen Reflex to HCV RNA Quant and Genotype    Screening for diabetes mellitus  He has biometric screening forms that are requesting an A1c as part of the lab workup.  - Hemoglobin A1c; Future  - Hemoglobin A1c    Patient has been advised of split billing requirements and indicates understanding:  "Yes        BMI  Estimated body mass index is 37.83 kg/m  as calculated from the following:    Height as of this encounter: 1.867 m (6' 1.5\").    Weight as of this encounter: 131.9 kg (290 lb 11.2 oz).   Weight management plan: Discussed healthy diet and exercise guidelines    Counseling  Appropriate preventive services were addressed with this patient via screening, questionnaire, or discussion as appropriate for fall prevention, nutrition, physical activity, Tobacco-use cessation, social engagement, weight loss and cognition.  Checklist reviewing preventive services available has been given to the patient.  Reviewed patient's diet, addressing concerns and/or questions.   The patient was instructed to see the dentist every 6 months.   He is at risk for psychosocial distress and has been provided with information to reduce risk.   The patient's PHQ-9 score is consistent with mild depression. He was provided with information regarding depression.           Ted Jacobsen is a 29 year old, presenting for the following:  Physical        8/20/2024    11:20 AM   Additional Questions   Roomed by Korin VELIZ         8/20/2024   Forms   Any forms needing to be completed Yes         Health Care Directive  Patient does not have a Health Care Directive or Living Will: Discussed advance care planning with patient; however, patient declined at this time.    HPI    He has a medical history significant for depression, anxiety, hyperlipidemia, hair loss and obesity.  He is on Wellbutrin which has been working well.  He also takes finasteride 1 mg daily.  He used to be on fenofibrate, but has been off this lately.  He is working hard on lifestyle modifications, but still struggling to lose weight.              8/19/2024   General Health   How would you rate your overall physical health? (!) FAIR   Feel stress (tense, anxious, or unable to sleep) Only a little      (!) STRESS CONCERN      8/19/2024   Nutrition   Three or more servings " of calcium each day? Yes   Diet: Breakfast skipped   How many servings of fruit and vegetables per day? (!) 0-1   How many sweetened beverages each day? 0-1            8/19/2024   Exercise   Days per week of moderate/strenous exercise 5 days   Average minutes spent exercising at this level 60 min            8/19/2024   Social Factors   Frequency of gathering with friends or relatives Three times a week   Worry food won't last until get money to buy more No   Food not last or not have enough money for food? No   Do you have housing? (Housing is defined as stable permanent housing and does not include staying ouside in a car, in a tent, in an abandoned building, in an overnight shelter, or couch-surfing.) Yes   Are you worried about losing your housing? No   Lack of transportation? No   Unable to get utilities (heat,electricity)? No            8/19/2024   Dental   Dentist two times every year? (!) NO            8/19/2024   TB Screening   Were you born outside of the [place]? No          Today's PHQ-9 Score:       8/19/2024    12:11 PM   PHQ-9 SCORE   PHQ-9 Total Score MyChart 6 (Mild depression)   PHQ-9 Total Score 6         8/19/2024   Substance Use   Alcohol more than 3/day or more than 7/wk No   Do you use any other substances recreationally? (!) CANNABIS PRODUCTS        Social History     Tobacco Use    Smoking status: Never    Smokeless tobacco: Never   Vaping Use    Vaping status: Never Used   Substance Use Topics    Alcohol use: Yes     Comment: rare; once a week    Drug use: Yes     Types: Marijuana           8/19/2024   STI Screening   New sexual partner(s) since last STI/HIV test? No            8/19/2024   Contraception/Family Planning   Questions about contraception or family planning No           Reviewed and updated as needed this visit by Provider                          Review of Systems  Constitutional, HEENT, cardiovascular, pulmonary, GI, , musculoskeletal, neuro, skin, endocrine and psych systems are  "negative, except as otherwise noted.     Objective    Exam  /84   Pulse 72   Temp 97.5  F (36.4  C) (Temporal)   Resp 18   Ht 1.867 m (6' 1.5\")   Wt 131.9 kg (290 lb 11.2 oz)   SpO2 98%   BMI 37.83 kg/m     Estimated body mass index is 37.83 kg/m  as calculated from the following:    Height as of this encounter: 1.867 m (6' 1.5\").    Weight as of this encounter: 131.9 kg (290 lb 11.2 oz).    Physical Exam  GENERAL: alert and no distress  EYES: Eyes grossly normal to inspection, PERRL and conjunctivae and sclerae normal  HENT: ear canals and TM's normal, nose and mouth without ulcers or lesions  NECK: no adenopathy, no asymmetry, masses, or scars  RESP: lungs clear to auscultation - no rales, rhonchi or wheezes  CV: regular rate and rhythm, normal S1 S2, no S3 or S4, no murmur, click or rub, no peripheral edema  ABDOMEN: soft, nontender, no hepatosplenomegaly, no masses and bowel sounds normal  MS: no gross musculoskeletal defects noted, no edema  SKIN: no suspicious lesions or rashes  NEURO: Normal strength and tone, mentation intact and speech normal  PSYCH: mentation appears normal, affect normal/bright        Signed Electronically by: Mark Cintron, DO    Answers submitted by the patient for this visit:  Patient Health Questionnaire (Submitted on 8/19/2024)  If you checked off any problems, how difficult have these problems made it for you to do your work, take care of things at home, or get along with other people?: Somewhat difficult  PHQ9 TOTAL SCORE: 6  ALISA-7 (Submitted on 8/20/2024)  ALISA 7 TOTAL SCORE: 3    "

## 2024-09-27 ENCOUNTER — VIRTUAL VISIT (OUTPATIENT)
Dept: ONCOLOGY | Facility: CLINIC | Age: 29
End: 2024-09-27
Attending: FAMILY MEDICINE
Payer: COMMERCIAL

## 2024-09-27 DIAGNOSIS — E66.812 CLASS 2 OBESITY DUE TO EXCESS CALORIES WITHOUT SERIOUS COMORBIDITY WITH BODY MASS INDEX (BMI) OF 37.0 TO 37.9 IN ADULT: ICD-10-CM

## 2024-09-27 DIAGNOSIS — E66.09 CLASS 2 OBESITY DUE TO EXCESS CALORIES WITHOUT SERIOUS COMORBIDITY WITH BODY MASS INDEX (BMI) OF 37.0 TO 37.9 IN ADULT: ICD-10-CM

## 2024-09-27 PROCEDURE — 97802 MEDICAL NUTRITION INDIV IN: CPT | Mod: GT,95 | Performed by: DIETITIAN, REGISTERED

## 2024-09-27 NOTE — LETTER
9/27/2024      Shubham Castaneda  [address] [facility] [address]      Dear Colleague,    Thank you for referring your patient, Shubham Castaneda, to the [facility]. Please see a copy of my visit note below.    No notes on file    Again, thank you for allowing me to participate in the care of your patient.        Sincerely,        Marcela Whitaker RD

## 2024-09-27 NOTE — CONFIDENTIAL NOTE
"Video-Visit Details     Type of service:  Video Visit     Video Start Time (time video started): 10:45am     Video End Time (time video stopped): 11:30am    Originating Location (pt. Location): Home     Distant Location (provider location):  [facility] NUTR[facility]      Mode of Communication:  Video Conference via [facility]      CLINICAL NUTRITION SERVICES - ASSESSMENT NOTE    Shubham Castaneda 29 year old referred for MNT related to Obesity    Time Spent: 45 minutes  Visit Type: video  Accompanied by: self  Referring Physician: Saida 8/20/24  E66.09, Z68.37 (ICD-10-CM) - Class 2 obesity due to excess calories without serious comorbidity with body mass index (BMI) of 37.0 to 37.9 in adult     NUTRITION HISTORY  Current diet: general diet/intermittent fasting 11/12-8pm  Previous diets/fad diets: 2021 he lost 'a ton of weight over 50 lb' with tracking his foods, weighing his foods using MFP and eating higher protein.   Activity: [place] [employer] 2 x a week for 1 hour HIT, Weight lifting 3 x /wk, golf 9 holes once a week.     Delmar presents today to obtain guidance on weight loss and lowering his cholesterol.   He would like to get his weight fluctuation under control as he has lost and regained weight several times.       Diet Recall  Breakfast Ice coffee   Lunch 3 eggs, 1 eng muffin, 4 pc Cong[place]ese lombardo M-F   Dinner Protein pasta, Tacos, Burrito or Mediterranean bowls   Snacks 3pm chicken nuggets (Bare) or sandwich meat (protein source)   Beverages Water and Powerade zero, 1 12 oz can; 1 beer during the week, 4-5/wk      ANTHROPOMETRICS  Height: 6'2\"  Weight: 290 lbs/131kg  BMI: 37  Weight Status:  Obesity Grade II BMI 35-39.9  Weight History:   Wt Readings from Last 10 Encounters:   08/20/24 131.9 kg (290 lb 11.2 oz)   10/25/22 122.5 kg (270 lb)   08/17/21 117.9 kg (260 lb)   01/22/21 120.7 kg (266 lb)   07/25/19 130.6 kg (288 lb)   09/20/18 123.4 kg (272 lb)   03/19/18 123.4 kg (272 lb)   11/22/17 " 127.9 kg (282 lb)   11/10/17 127.9 kg (282 lb)   04/17/14 106.6 kg (235 lb) (99%, Z= 2.24)*     * Growth percentiles are based on CDC (Boys, 2-20 Years) data.     Dosing Weight: 98kg    Medications/vitamins/minerals/herbals:   Reviewed    Labs:  Reviewed    ASSESSED NUTRITION NEEDS:  Estimated Energy Needs: 5566-1500 kcals (20-25 Kcal/Kg)  Justification: obese  Estimated Protein Needs: 100 grams protein (1 g pro/Kg)  Justification: maintenance    NUTRITION DIAGNOSIS:  Obesity related to self-monitoring deficit, excessive caloric intake AEB diet recall BMI >35    INTERVENTIONS  Provided written & verbal education:   --Discussed dietary and behavioral modifications to promote weight loss (portion control, meal consistency, measuring foods, portion plate method, fiber sources, mindfulness, volumetric eating protein sources and eating pace)   --Reviewed calorie goals for desired wt loss. Encouraged to limit calories to < 2400 daily.  Emphasized importance of tracking daily intake for accountability and to ensure adequacy.   --Reviewed fiber content of foods and role it plays in hearth health, BG control, appetite control etc.  Encouraged to aim for at least 25 grams/day. Encouraged to choose grains, breads etc with at least 3g fiber per serving. Encouraged to eat more seafood, plant based proteins and less saturated fats (red meats etc).   --Discussed protein intake. Encouraged to aim for at least 100 grams/day.    --Encouraged to start tracking daily intake and food behaviors.  Encouraged to set small goals and work towards them one at a time to prevent overwhelm.     Provided pt with corresponding education materials/handouts on:  --High fiber diet  --Fiber content of foods  --Mediterranean diet  --1800 calorie meal plan  --Thrive recipes  --Sources of protein    Link to Metamucil powder  www.metamucil.[employer]/en-us/products/fiber-powders/?jslijan=[email or web address]    Link to Volumetric Eating Plan:  The  Ultimate Volumetrics Diet: Smart, Simple, Science-Based Strategies for Losing Weight and Keeping It Off: Allison PhD, Ronald Gaines Celia: [phone]: Amazon.com: [employer]    Link for fish oil:   www.coromega.com    Pt verbalize understanding of materials provided during consult.   Patient Understanding: Excellent  Expected Compliance: Excellent     Goals  --Start tracking daily intake on MFP or vania/website of choice  --Weight loss (0.5-1.0 lb /week desirable)  --Fluids: 48-64+ oz/day (non-caffeine)  --Protein: 100 grams/day  --Calories: 9649-1636/day  --Fiber: 25-35 grams/day (try to find grains, cereals, rice etc with at least 3 g fiber per serving)    Follow-Up Plans: Pt has RD contact information for questions.      Marcela Whitaker RD, , LD

## 2024-09-27 NOTE — PATIENT INSTRUCTIONS
Ronan aJcobsen,     Here are some resources that you might find helpful (see your email):  --High fiber diet  --Fiber content of foods  --Mediterranean diet  --2200 calorie meal plan  --Thrive recipes  --Sources of protein    Link to Metamucil powder  www.metamucil.[employer]/en-us/products/fiber-powders/?btzkfkc=[email or web address]    Link to Volumetric Eating Plan:  The Ultimate Volumetrics Diet: Smart, Simple, Science-Based Strategies for Losing Weight and Keeping It Off: Allison PhD, Ronald Gaines, Celia: [phone]: Amazon.com: [employer]    Link for fish oil:   www.coromega.com    Nutrition Goals:  --Fluids: 48-64+ oz/day (non-caffeine)  --Protein: 100-130 grams/day  --Calories: 2243-2150/day  --Fiber: 25-35 grams/day (try to find grains, cereals, rice etc with at least 3 g fiber per serving)    Please reach out to me any time if you have any questions!      Be well,     Marcela Whitaker RD, , LD  [facility]  Outpatient Services  [email or web address]   Office: [phone]  Fax: [phone]

## 2025-01-14 ENCOUNTER — OFFICE VISIT (OUTPATIENT)
Dept: INTERNAL MEDICINE | Facility: CLINIC | Age: 30
End: 2025-01-14
Payer: COMMERCIAL

## 2025-01-14 ENCOUNTER — ANCILLARY PROCEDURE (OUTPATIENT)
Dept: GENERAL RADIOLOGY | Facility: CLINIC | Age: 30
End: 2025-01-14
Attending: PHYSICIAN ASSISTANT
Payer: COMMERCIAL

## 2025-01-14 VITALS
BODY MASS INDEX: 37.35 KG/M2 | TEMPERATURE: 97.6 F | OXYGEN SATURATION: 99 % | SYSTOLIC BLOOD PRESSURE: 126 MMHG | HEART RATE: 65 BPM | RESPIRATION RATE: 18 BRPM | DIASTOLIC BLOOD PRESSURE: 70 MMHG | WEIGHT: 287 LBS

## 2025-01-14 DIAGNOSIS — R50.9 FEELS FEVERISH: ICD-10-CM

## 2025-01-14 DIAGNOSIS — J20.9 ACUTE BRONCHITIS WITH SYMPTOMS > 10 DAYS: Primary | ICD-10-CM

## 2025-01-14 DIAGNOSIS — R05.8 PRODUCTIVE COUGH: ICD-10-CM

## 2025-01-14 DIAGNOSIS — R05.2 SUBACUTE COUGH: ICD-10-CM

## 2025-01-14 PROCEDURE — 99213 OFFICE O/P EST LOW 20 MIN: CPT | Performed by: PHYSICIAN ASSISTANT

## 2025-01-14 PROCEDURE — 71046 X-RAY EXAM CHEST 2 VIEWS: CPT | Mod: TC | Performed by: RADIOLOGY

## 2025-01-14 ASSESSMENT — PATIENT HEALTH QUESTIONNAIRE - PHQ9
SUM OF ALL RESPONSES TO PHQ QUESTIONS 1-9: 4
10. IF YOU CHECKED OFF ANY PROBLEMS, HOW DIFFICULT HAVE THESE PROBLEMS MADE IT FOR YOU TO DO YOUR WORK, TAKE CARE OF THINGS AT HOME, OR GET ALONG WITH OTHER PEOPLE: NOT DIFFICULT AT ALL
SUM OF ALL RESPONSES TO PHQ QUESTIONS 1-9: 4

## 2025-01-14 NOTE — PROGRESS NOTES
Assessment & Plan     Acute bronchitis with symptoms > 10 days      Productive cough    - amoxicillin-clavulanate (AUGMENTIN) 875-125 MG tablet; Take 1 tablet by mouth 2 times daily.    Subacute cough    - XR Chest 2 Views; Future    Feels feverish    - XR Chest 2 Views; Future    Reviewed results via my chart  Given length of symptoms treatment as noted. With abx.  Over the counter reviewed for coughing. Mucinex DM               Subjective   Delmar is a 29 year old, presenting for the following health issues:  URI    History of Present Illness       Reason for visit:  Flu symptoms  Symptom onset:  3-4 weeks ago  Symptoms include:  Cough, sore throat,clogged nose, cold, night sweats  Symptom intensity:  Severe  Symptom progression:  Improving  Had these symptoms before:  Yes  Has tried/received treatment for these symptoms:  Yes  Previous treatment was successful:  Yes  Prior treatment description:  Tamiflu He is missing 2 dose(s) of medications per week.  He is not taking prescribed medications regularly due to remembering to take.     RESPIRATORY SYMPTOMS    Duration: 4 weeks   Description  nasal congestion, rhinorrhea, cough phlegm wet coughing now in the past few day , fever 99 , and chills  Severity: moderate  Accompanying signs and symptoms: None  History (predisposing factors):  influenza - treatment with tamiflu   Precipitating or alleviating factors: None  Therapies tried and outcome:  OTC NSAID   Nyquil and Dayquil                       Objective    /70 (BP Location: Left arm, Patient Position: Sitting, Cuff Size: Adult Large)   Pulse 65   Temp 97.6  F (36.4  C) (Temporal)   Resp 18   Wt 130.2 kg (287 lb)   SpO2 99%   BMI 37.35 kg/m    Body mass index is 37.35 kg/m .  Physical Exam   GENERAL: alert and no distress  HENT: normal cephalic/atraumatic, ear canals and TM's normal, nose and mouth without ulcers or lesions, nasal mucosa edematous , rhinorrhea clear, oropharynx clear, and oral mucous  membranes moist  NECK: no adenopathy, no asymmetry, masses, or scars  RESP: lungs clear to auscultation - no rales, rhonchi or wheezes  CV: regular rates and rhythm and normal S1 S2, no S3 or S4  MS: no gross musculoskeletal defects noted, no edema    Narrative & Impression   EXAM: XR CHEST 2 VIEWS  LOCATION: [facility]  DATE: 01/14/2025     INDICATION: Subacute cough, feels feverish.  COMPARISON: None.                                                                      IMPRESSION: Negative chest.           Signed Electronically by: Adri Avina PA-C

## 2025-07-21 ENCOUNTER — PATIENT OUTREACH (OUTPATIENT)
Dept: CARE COORDINATION | Facility: CLINIC | Age: 30
End: 2025-07-21
Payer: COMMERCIAL

## 2025-08-04 ENCOUNTER — PATIENT OUTREACH (OUTPATIENT)
Dept: CARE COORDINATION | Facility: CLINIC | Age: 30
End: 2025-08-04
Payer: COMMERCIAL